# Patient Record
Sex: MALE | Race: WHITE | NOT HISPANIC OR LATINO | Employment: FULL TIME | URBAN - NONMETROPOLITAN AREA
[De-identification: names, ages, dates, MRNs, and addresses within clinical notes are randomized per-mention and may not be internally consistent; named-entity substitution may affect disease eponyms.]

---

## 2018-05-02 ENCOUNTER — OFFICE VISIT CONVERTED (OUTPATIENT)
Dept: FAMILY MEDICINE CLINIC | Age: 53
End: 2018-05-02
Attending: NURSE PRACTITIONER

## 2019-02-12 ENCOUNTER — OFFICE VISIT CONVERTED (OUTPATIENT)
Dept: FAMILY MEDICINE CLINIC | Age: 54
End: 2019-02-12
Attending: NURSE PRACTITIONER

## 2019-02-12 ENCOUNTER — HOSPITAL ENCOUNTER (OUTPATIENT)
Dept: OTHER | Facility: HOSPITAL | Age: 54
Discharge: HOME | End: 2019-02-12
Attending: NURSE PRACTITIONER

## 2019-02-12 LAB
ALBUMIN SERPL-MCNC: 4.5 G/DL (ref 3.5–5)
ALBUMIN/GLOB SERPL: 1.6 {RATIO} (ref 1.4–2.6)
ALP SERPL-CCNC: 73 U/L (ref 56–119)
ALT SERPL-CCNC: 34 U/L (ref 10–40)
ANION GAP SERPL CALC-SCNC: 17 MMOL/L (ref 8–19)
AST SERPL-CCNC: 26 U/L (ref 15–50)
BILIRUB SERPL-MCNC: 0.83 MG/DL (ref 0.2–1.3)
BUN SERPL-MCNC: 14 MG/DL (ref 5–25)
BUN/CREAT SERPL: 13 {RATIO} (ref 6–20)
CALCIUM SERPL-MCNC: 9.6 MG/DL (ref 8.7–10.4)
CHLORIDE SERPL-SCNC: 97 MMOL/L (ref 99–111)
CHOLEST SERPL-MCNC: 141 MG/DL (ref 107–200)
CHOLEST/HDLC SERPL: 3.3 {RATIO} (ref 3–6)
CONV CO2: 26 MMOL/L (ref 22–32)
CONV CREATININE URINE, RANDOM: 299.3 MG/DL (ref 10–300)
CONV MICROALBUM.,U,RANDOM: <12 MG/L (ref 0–20)
CONV TOTAL PROTEIN: 7.3 G/DL (ref 6.3–8.2)
CREAT UR-MCNC: 1.05 MG/DL (ref 0.7–1.2)
EST. AVERAGE GLUCOSE BLD GHB EST-MCNC: 146 MG/DL
GFR SERPLBLD BASED ON 1.73 SQ M-ARVRAT: >60 ML/MIN/{1.73_M2}
GLOBULIN UR ELPH-MCNC: 2.8 G/DL (ref 2–3.5)
GLUCOSE SERPL-MCNC: 111 MG/DL (ref 70–99)
HBA1C MFR BLD: 6.7 % (ref 3.5–5.7)
HDLC SERPL-MCNC: 43 MG/DL (ref 40–60)
LDLC SERPL CALC-MCNC: 77 MG/DL (ref 70–100)
MICROALBUMIN/CREAT UR: 4 MG/G{CRE} (ref 0–25)
OSMOLALITY SERPL CALC.SUM OF ELEC: 283 MOSM/KG (ref 273–304)
POTASSIUM SERPL-SCNC: 3.7 MMOL/L (ref 3.5–5.3)
SODIUM SERPL-SCNC: 136 MMOL/L (ref 135–147)
TRIGL SERPL-MCNC: 103 MG/DL (ref 40–150)
VLDLC SERPL-MCNC: 21 MG/DL (ref 5–37)

## 2019-10-07 ENCOUNTER — HOSPITAL ENCOUNTER (OUTPATIENT)
Dept: OTHER | Facility: HOSPITAL | Age: 54
Discharge: HOME | End: 2019-10-07
Attending: NURSE PRACTITIONER

## 2019-10-07 ENCOUNTER — OFFICE VISIT CONVERTED (OUTPATIENT)
Dept: FAMILY MEDICINE CLINIC | Age: 54
End: 2019-10-07
Attending: NURSE PRACTITIONER

## 2019-10-07 LAB
ALBUMIN SERPL-MCNC: 4.8 G/DL (ref 3.5–5)
ALBUMIN/GLOB SERPL: 1.5 {RATIO} (ref 1.4–2.6)
ALP SERPL-CCNC: 77 U/L (ref 56–119)
ALT SERPL-CCNC: 34 U/L (ref 10–40)
ANION GAP SERPL CALC-SCNC: 22 MMOL/L (ref 8–19)
AST SERPL-CCNC: 25 U/L (ref 15–50)
BILIRUB SERPL-MCNC: 0.76 MG/DL (ref 0.2–1.3)
BUN SERPL-MCNC: 13 MG/DL (ref 5–25)
BUN/CREAT SERPL: 12 {RATIO} (ref 6–20)
CALCIUM SERPL-MCNC: 9.9 MG/DL (ref 8.7–10.4)
CHLORIDE SERPL-SCNC: 97 MMOL/L (ref 99–111)
CHOLEST SERPL-MCNC: 159 MG/DL (ref 107–200)
CHOLEST/HDLC SERPL: 4 {RATIO} (ref 3–6)
CONV CO2: 24 MMOL/L (ref 22–32)
CONV TOTAL PROTEIN: 7.9 G/DL (ref 6.3–8.2)
CREAT UR-MCNC: 1.07 MG/DL (ref 0.7–1.2)
EST. AVERAGE GLUCOSE BLD GHB EST-MCNC: 186 MG/DL
GFR SERPLBLD BASED ON 1.73 SQ M-ARVRAT: >60 ML/MIN/{1.73_M2}
GLOBULIN UR ELPH-MCNC: 3.1 G/DL (ref 2–3.5)
GLUCOSE SERPL-MCNC: 152 MG/DL (ref 70–99)
HBA1C MFR BLD: 8.1 % (ref 3.5–5.7)
HDLC SERPL-MCNC: 40 MG/DL (ref 40–60)
LDLC SERPL CALC-MCNC: 72 MG/DL (ref 70–100)
OSMOLALITY SERPL CALC.SUM OF ELEC: 291 MOSM/KG (ref 273–304)
POTASSIUM SERPL-SCNC: 3.8 MMOL/L (ref 3.5–5.3)
SODIUM SERPL-SCNC: 139 MMOL/L (ref 135–147)
TRIGL SERPL-MCNC: 234 MG/DL (ref 40–150)
VLDLC SERPL-MCNC: 47 MG/DL (ref 5–37)

## 2020-01-27 ENCOUNTER — HOSPITAL ENCOUNTER (OUTPATIENT)
Dept: OTHER | Facility: HOSPITAL | Age: 55
Discharge: HOME | End: 2020-01-27
Attending: NURSE PRACTITIONER

## 2020-01-27 LAB
ALBUMIN SERPL-MCNC: 4.7 G/DL (ref 3.5–5)
ALBUMIN/GLOB SERPL: 1.7 {RATIO} (ref 1.4–2.6)
ALP SERPL-CCNC: 80 U/L (ref 56–119)
ALT SERPL-CCNC: 29 U/L (ref 10–40)
ANION GAP SERPL CALC-SCNC: 20 MMOL/L (ref 8–19)
AST SERPL-CCNC: 23 U/L (ref 15–50)
BILIRUB SERPL-MCNC: 0.56 MG/DL (ref 0.2–1.3)
BUN SERPL-MCNC: 13 MG/DL (ref 5–25)
BUN/CREAT SERPL: 13 {RATIO} (ref 6–20)
CALCIUM SERPL-MCNC: 9.8 MG/DL (ref 8.7–10.4)
CHLORIDE SERPL-SCNC: 97 MMOL/L (ref 99–111)
CHOLEST SERPL-MCNC: 142 MG/DL (ref 107–200)
CHOLEST/HDLC SERPL: 4.3 {RATIO} (ref 3–6)
CONV CO2: 26 MMOL/L (ref 22–32)
CONV TOTAL PROTEIN: 7.5 G/DL (ref 6.3–8.2)
CREAT UR-MCNC: 1.03 MG/DL (ref 0.7–1.2)
EST. AVERAGE GLUCOSE BLD GHB EST-MCNC: 163 MG/DL
GFR SERPLBLD BASED ON 1.73 SQ M-ARVRAT: >60 ML/MIN/{1.73_M2}
GLOBULIN UR ELPH-MCNC: 2.8 G/DL (ref 2–3.5)
GLUCOSE SERPL-MCNC: 106 MG/DL (ref 70–99)
HBA1C MFR BLD: 7.3 % (ref 3.5–5.7)
HDLC SERPL-MCNC: 33 MG/DL (ref 40–60)
LDLC SERPL CALC-MCNC: 50 MG/DL (ref 70–100)
OSMOLALITY SERPL CALC.SUM OF ELEC: 291 MOSM/KG (ref 273–304)
POTASSIUM SERPL-SCNC: 3.4 MMOL/L (ref 3.5–5.3)
SODIUM SERPL-SCNC: 140 MMOL/L (ref 135–147)
TRIGL SERPL-MCNC: 295 MG/DL (ref 40–150)
VLDLC SERPL-MCNC: 59 MG/DL (ref 5–37)

## 2020-04-13 ENCOUNTER — OFFICE VISIT CONVERTED (OUTPATIENT)
Dept: FAMILY MEDICINE CLINIC | Age: 55
End: 2020-04-13
Attending: NURSE PRACTITIONER

## 2020-08-10 ENCOUNTER — HOSPITAL ENCOUNTER (OUTPATIENT)
Dept: OTHER | Facility: HOSPITAL | Age: 55
Discharge: HOME | End: 2020-08-10
Attending: NURSE PRACTITIONER

## 2020-08-13 LAB
ALBUMIN SERPL-MCNC: 4.8 G/DL (ref 3.5–5)
ALBUMIN/GLOB SERPL: 1.7 {RATIO} (ref 1.4–2.6)
ALP SERPL-CCNC: 82 U/L (ref 56–119)
ALT SERPL-CCNC: 24 U/L (ref 10–40)
ANION GAP SERPL CALC-SCNC: 18 MMOL/L (ref 8–19)
AST SERPL-CCNC: 24 U/L (ref 15–50)
BILIRUB SERPL-MCNC: 1.26 MG/DL (ref 0.2–1.3)
BUN SERPL-MCNC: 29 MG/DL (ref 5–25)
BUN/CREAT SERPL: 22 {RATIO} (ref 6–20)
CALCIUM SERPL-MCNC: 10.7 MG/DL (ref 8.7–10.4)
CHLORIDE SERPL-SCNC: 96 MMOL/L (ref 99–111)
CHOLEST SERPL-MCNC: 134 MG/DL (ref 107–200)
CHOLEST/HDLC SERPL: 3 {RATIO} (ref 3–6)
CONV CO2: 25 MMOL/L (ref 22–32)
CONV CREATININE URINE, RANDOM: 247 MG/DL (ref 10–300)
CONV MICROALBUM.,U,RANDOM: <12 MG/L (ref 0–20)
CONV TOTAL PROTEIN: 7.7 G/DL (ref 6.3–8.2)
CREAT UR-MCNC: 1.33 MG/DL (ref 0.7–1.2)
EST. AVERAGE GLUCOSE BLD GHB EST-MCNC: 174 MG/DL
GFR SERPLBLD BASED ON 1.73 SQ M-ARVRAT: >60 ML/MIN/{1.73_M2}
GLOBULIN UR ELPH-MCNC: 2.9 G/DL (ref 2–3.5)
GLUCOSE SERPL-MCNC: 98 MG/DL (ref 70–99)
HBA1C MFR BLD: 7.7 % (ref 3.5–5.7)
HDLC SERPL-MCNC: 44 MG/DL (ref 40–60)
LDLC SERPL CALC-MCNC: 71 MG/DL (ref 70–100)
MICROALBUMIN/CREAT UR: 4.9 MG/G{CRE} (ref 0–25)
OSMOLALITY SERPL CALC.SUM OF ELEC: 288 MOSM/KG (ref 273–304)
POTASSIUM SERPL-SCNC: 3.4 MMOL/L (ref 3.5–5.3)
SODIUM SERPL-SCNC: 136 MMOL/L (ref 135–147)
TRIGL SERPL-MCNC: 97 MG/DL (ref 40–150)
VLDLC SERPL-MCNC: 19 MG/DL (ref 5–37)

## 2020-08-31 ENCOUNTER — HOSPITAL ENCOUNTER (OUTPATIENT)
Dept: OTHER | Facility: HOSPITAL | Age: 55
Discharge: HOME | End: 2020-08-31
Attending: NURSE PRACTITIONER

## 2020-08-31 LAB
ANION GAP SERPL CALC-SCNC: 18 MMOL/L (ref 8–19)
BUN SERPL-MCNC: 19 MG/DL (ref 5–25)
BUN/CREAT SERPL: 17 {RATIO} (ref 6–20)
CALCIUM SERPL-MCNC: 10.1 MG/DL (ref 8.7–10.4)
CHLORIDE SERPL-SCNC: 100 MMOL/L (ref 99–111)
CONV CO2: 25 MMOL/L (ref 22–32)
CREAT UR-MCNC: 1.1 MG/DL (ref 0.7–1.2)
GFR SERPLBLD BASED ON 1.73 SQ M-ARVRAT: >60 ML/MIN/{1.73_M2}
GLUCOSE SERPL-MCNC: 131 MG/DL (ref 70–99)
OSMOLALITY SERPL CALC.SUM OF ELEC: 292 MOSM/KG (ref 273–304)
POTASSIUM SERPL-SCNC: 3.5 MMOL/L (ref 3.5–5.3)
SODIUM SERPL-SCNC: 139 MMOL/L (ref 135–147)

## 2021-01-07 ENCOUNTER — HOSPITAL ENCOUNTER (OUTPATIENT)
Dept: OTHER | Facility: HOSPITAL | Age: 56
Discharge: HOME | End: 2021-01-07
Attending: NURSE PRACTITIONER

## 2021-01-07 LAB
ALBUMIN SERPL-MCNC: 4.6 G/DL (ref 3.5–5)
ALBUMIN/GLOB SERPL: 1.5 {RATIO} (ref 1.4–2.6)
ALP SERPL-CCNC: 90 U/L (ref 56–119)
ALT SERPL-CCNC: 33 U/L (ref 10–40)
ANION GAP SERPL CALC-SCNC: 18 MMOL/L (ref 8–19)
AST SERPL-CCNC: 22 U/L (ref 15–50)
BILIRUB SERPL-MCNC: 1.25 MG/DL (ref 0.2–1.3)
BUN SERPL-MCNC: 16 MG/DL (ref 5–25)
BUN/CREAT SERPL: 16 {RATIO} (ref 6–20)
CALCIUM SERPL-MCNC: 9.6 MG/DL (ref 8.7–10.4)
CHLORIDE SERPL-SCNC: 97 MMOL/L (ref 99–111)
CHOLEST SERPL-MCNC: 164 MG/DL (ref 107–200)
CHOLEST/HDLC SERPL: 3.7 {RATIO} (ref 3–6)
CONV CO2: 25 MMOL/L (ref 22–32)
CONV TOTAL PROTEIN: 7.7 G/DL (ref 6.3–8.2)
CREAT UR-MCNC: 1.02 MG/DL (ref 0.7–1.2)
EST. AVERAGE GLUCOSE BLD GHB EST-MCNC: 174 MG/DL
GFR SERPLBLD BASED ON 1.73 SQ M-ARVRAT: >60 ML/MIN/{1.73_M2}
GLOBULIN UR ELPH-MCNC: 3.1 G/DL (ref 2–3.5)
GLUCOSE SERPL-MCNC: 180 MG/DL (ref 70–99)
HBA1C MFR BLD: 7.7 % (ref 3.5–5.7)
HDLC SERPL-MCNC: 44 MG/DL (ref 40–60)
LDLC SERPL CALC-MCNC: 99 MG/DL (ref 70–100)
OSMOLALITY SERPL CALC.SUM OF ELEC: 288 MOSM/KG (ref 273–304)
POTASSIUM SERPL-SCNC: 3.8 MMOL/L (ref 3.5–5.3)
SODIUM SERPL-SCNC: 136 MMOL/L (ref 135–147)
TRIGL SERPL-MCNC: 106 MG/DL (ref 40–150)
VLDLC SERPL-MCNC: 21 MG/DL (ref 5–37)

## 2021-02-01 ENCOUNTER — OFFICE VISIT CONVERTED (OUTPATIENT)
Dept: FAMILY MEDICINE CLINIC | Age: 56
End: 2021-02-01
Attending: NURSE PRACTITIONER

## 2021-05-18 NOTE — PROGRESS NOTES
Jared Restrepo 1965     Office/Outpatient Visit    Visit Date: Wed, May 2, 2018 09:25 am    Provider: Anamaria Edwards N.P. (Assistant: Janee Edwards MA)    Location: [facility]        Electronically signed by Anamaria Edwards N.P. on  05/02/2018 12:45:35 PM                             SUBJECTIVE:        CC:     Mr. Restrepo is a 53 year old Black or  male.  Patient is here for routine check up.          HPI:         Patient to be evaluated for hypercholesterolemia.  Current treatment includes Lipitor.  Compliance with treatment has been good; he takes his medication as directed.  He denies experiencing any hypercholesterolemia related symptoms.          In regard to the hypertension, his current cardiac medication regimen includes an ACE inhibitor ( Zestril ) and a combination medication ( Tenoretic 50 ).  He is tolerating the medication well without side effects.  Compliance with treatment has been good; he takes his medication as directed.          Dx with type 2 diabetes; current meds include an oral hypoglycemic ( Glucophage XR ( 500mg qd ) ).  Most recent lab results include Hemoglobin A1c:  6.8 (03/23/2018), LDL:  97 (mg/dL) (09/12/2017), HDL:  44 (mg/dL) (09/12/2017), Triglycerides:  94 (mg/dL) (09/12/2017), Microalbuminuria:  8.4 (mg/g creat) (09/12/2017), Dilated Eye Exam by date:  03/14/2016 (01/10/2017), Foot Exam (Annual):  09/12/2017 (09/12/2017).      ROS:     CONSTITUTIONAL:  Negative for chills, fatigue, fever, and weight change.      CARDIOVASCULAR:  Negative for chest pain, palpitations, tachycardia, orthopnea, and edema.      RESPIRATORY:  Negative for cough, dyspnea, and hemoptysis.      NEUROLOGICAL:  Negative for dizziness, headaches, paresthesias, and weakness.          PMH/FMH/SH:     Last Reviewed on 5/02/2018 10:08 AM by Anamaria Edwards    Past Medical History:         Hypertension         Surgical History:         lipoma;     Procedures:     Colonoscopy ( 3-17-16 normal [facility] )         Family History:     Father:  at age 40's; Cause of death was pancreatitis    ; Positive for Type 2 Diabetes;     Mother:    Positive for Arrhythmia;     ; Positive for Lung Cancer;     Brother(s): 2 brother(s) total    ; Positive for Type 2 Diabetes;     Sister(s): 1 sister(s) total    ; Positive for thyroid disorder;     Daughter(s): Healthy; 1 daughter(s) total     Paternal Grandfather: Cause of death was cancer     Paternal Grandmother: Cause of death was cancer     Maternal Grandfather:      Maternal Grandmother:  Father:  at age 40's; Cause of death was pancreatitis     Mother: Lung Cancer     Son(s): 3 son(s) total; 1 ;  MVA     Daughter(s): Healthy; 1 daughter(s) total         Social History:     Occupation: [employer]      Marital Status:      Children: 4 children         Tobacco/Alcohol/Supplements:     Last Reviewed on 2018 09:26 AM by Janee Edwards    Tobacco: He has never smoked.              Immunizations:     None        Allergies:     Last Reviewed on 2018 09:28 AM by Janee Edwards    Penicillins:        Current Medications:     Last Reviewed on 2018 09:28 AM by Janee Edwards    Atenolol/Chlorthalidone 50mg/25mg Tablet Take 1/2 tablet daily     Lipitor 10mg Tablet 1 tab daily     Lisinopril 10mg Tablet Take 1 tablet(s) by mouth daily     Metformin HCl 500mg Tablets, Extended Release 1 tab daily         OBJECTIVE:        Vitals:         Current: 2018 9:28:12 AM    Ht:  6 ft, 0 in;  Wt: 220.6 lbs;  BMI: 29.9    T: 98.5 F (oral);  BP: 119/64 mm Hg (left arm, sitting);  P: 61 bpm (left arm (BP Cuff), sitting);  sCr: 1 mg/dL;  GFR: 91.92        Exams:     PHYSICAL EXAM:     GENERAL: Vitals recorded well developed, well nourished;  well groomed;  no apparent distress;     NECK: carotid exam reveals no bruits;     RESPIRATORY: normal respiratory rate and pattern with no distress; normal  breath sounds with no rales, rhonchi, wheezes or rubs;     CARDIOVASCULAR: normal rate; rhythm is regular;  no systolic murmur; no edema;     PSYCHIATRIC:  appropriate affect and demeanor; normal speech pattern; grossly normal memory;         Procedures:     Vaccination against hepatitis A     1. Hepatitis A (adult): 1.0 ml given IM in the right upper arm; administered by mlb;  lot number ; expires 10-; Information sheet given             ASSESSMENT           272.0   E78.5  Hypercholesterolemia              DDx:     401.1   I10  Hypertension              DDx:     250.00   E11.9  Type 2 diabetes              DDx:     V69.8   Z72.89  Other problems related to lifestyle              DDx:     V05.3   Z23  Vaccination against hepatitis A              DDx:         ORDERS:         Meds Prescribed:       Refill of: Atenolol/Chlorthalidone 50mg/25mg Tablet Take 1/2 tablet daily  #45 (Forty Five) tablet(s) Refills: 1       Refill of: Lipitor (Atorvastatin Calcium) 10mg Tablet 1 tab daily  #90 (Ninety) tablet(s) Refills: 1       Refill of: Lisinopril 10mg Tablet Take 1 tablet(s) by mouth daily  #90 (Ninety) tablet(s) Refills: 1       Refill of: Metformin HCl 500mg Tablets, Extended Release 1 tab daily  #90 (Ninety) tablet(s) Refills: 1         Lab Orders:       [id number]  [id number] [facility] ([facility] Antibody Medicare screen  (Send-Out)         52142  [id number] [facility] Comp Metabolic Panel (14)  (Send-Out)         22420  [id number] [facility] Lipid Panel (Excludes LDL/HDL ratio)  (Send-Out)           Procedures Ordered:       [id number]  Immunization administration; one vaccine  (In-House)         65036  HepA vaccine adult dose for intramuscular use  (In-House)                   PLAN:          Hypercholesterolemia He is going to [place] next week.     LABORATORY:  [facility].  CMP Lipid panel           Prescriptions:       Refill of: Lipitor (Atorvastatin Calcium) 10mg Tablet 1 tab daily   #90 (Ninety) tablet(s) Refills: 1           Orders:       [id number]  [id number] [facility] Comp Metabolic Panel (14)  (Send-Out)         18863  [id number] [facility] Lipid Panel (Excludes LDL/HDL ratio)  (Send-Out)            Hypertension           Prescriptions:       Refill of: Atenolol/Chlorthalidone 50mg/25mg Tablet Take 1/2 tablet daily  #45 (Forty Five) tablet(s) Refills: 1       Refill of: Lisinopril 10mg Tablet Take 1 tablet(s) by mouth daily  #90 (Ninety) tablet(s) Refills: 1          Type 2 diabetes he had not been exercising or eating as healthy over the winter, he is back on track now; had a recent A1C, will recheck that in a few months /reviewed diabetes flow sheet         RECOMMENDATIONS: eat heatlhy, regular exercise, monitor sugars.      FOLLOW-UP: repeat A1C in 2-3 months           Prescriptions:       Refill of: Metformin HCl 500mg Tablets, Extended Release 1 tab daily  #90 (Ninety) tablet(s) Refills: 1          Other problems related to lifestyle     LABORATORY:  Labs ordered to be performed today at [facility] include Hepatitis C Screening.            Orders:       [id number]  [id number] [facility] Hepatitic C (HCV) Antibody Medicare screen  (Send-Out)             Patient Education Handouts:       Hepatitis C           Vaccination against hepatitis A           Orders:       [id number]  Immunization administration; one vaccine  (In-House)         83115  HepA vaccine adult dose for intramuscular use  (In-House)               CHARGE CAPTURE           **Please note: ICD descriptions below are intended for billing purposes only and may not represent clinical diagnoses**        Primary Diagnosis:         272.0 Hypercholesterolemia            E78.5    Hyperlipidemia, unspecified              Orders:          62147   Office/outpatient visit; established patient, level 4  (In-House)           401.1 Hypertension            I10    Essential (primary) hypertension    250.00 Type 2 diabetes            E11.9    Type 2 diabetes mellitus without  complications    V69.8 Other problems related to lifestyle            Z72.89    Other problems related to lifestyle    V05.3 Vaccination against hepatitis A            Z23    Encounter for immunization              Orders:          03220   Immunization administration; one vaccine  (In-House)             22147   HepA vaccine adult dose for intramuscular use  (In-House)

## 2021-05-18 NOTE — PROGRESS NOTES
Jared Restrepo  1965     Office/Outpatient Visit    Visit Date: Mon, Feb 1, 2021 09:14 am    Provider: Anamaria Edwards N.P. (Assistant: Sidra Chamberlain MA)    Location: [facility]        Electronically signed by Anamaria Edwards N.P. on  02/01/2021 09:49:01 AM                             Subjective:        CC: Mr. Restrepo is a 55 year old Black or  male.  This is a follow-up visit.          HPI:           Patient presents with essential (primary) hypertension.  His current cardiac medication regimen includes an ACE inhibitor ( Zestril ) and a combination medication ( Tenoretic 50 ).  He did not bring his blood pressure diary, but says that pressures have been well controlled.  He is tolerating the medication well without side effects.  Compliance with treatment has been good; he takes his medication as directed.            Dx with type 2 diabetes mellitus without complications; current meds include an oral hypoglycemic ( Glucophage ( Other (enter) ) and metformin 750 ).  He reports home blood glucose readings have been excellent, with average fasting glucoses running <120 mg/dL.  Most recent lab results include Weight (lb):  227.6 (10/07/2019), Hemoglobin A1c:  7.7 (%) (01/07/2021), LDL:  99 (mg/dL) (01/07/2021), HDL:  44 (mg/dL) (01/07/2021), Triglycerides:  106 (mg/dL) (01/07/2021), Microalbuminuria:  4.9 (mg/g creat) (08/10/2020), Dilated Eye Exam by date:  10/01/2019 (10/07/2019), Foot Exam (Annual):  02/12/2019 (02/12/2019).            Mixed hyperlipidemia details; current treatment includes Lipitor.  Compliance with treatment has been good.  He denies experiencing any hypercholesterolemia related symptoms.      ROS:     CONSTITUTIONAL:  Negative for fever.  increased sweets during holiday's and does not exercise as much in the winter     EYES:  Negative for blurred vision.      CARDIOVASCULAR:  Negative for chest pain, palpitations, tachycardia, orthopnea, and  edema.      RESPIRATORY:  Negative for recent cough and dyspnea.      GASTROINTESTINAL:  Negative for abdominal pain, heartburn, constipation, diarrhea, and stool changes.      MUSCULOSKELETAL:  Negative for feet pain.      NEUROLOGICAL:  Negative for dizziness, headaches, paresthesias, and weakness.          Past Medical History / Family History / Social History:         Last Reviewed on 2021 09:30 AM by Anamaria Edwards    Past Medical History:         Hypertension         Surgical History:         lipoma;     Procedures:    Colonoscopy ( 3-17-16 normal [facility] )         Family History:     Father:  at age 40's; Cause of death was pancreatitis    ; Positive for Type 2 Diabetes;     Mother:    Positive for Arrhythmia;     ; Positive for Lung Cancer;     Brother(s): 2 brother(s) total    ; Positive for Type 2 Diabetes;     Sister(s): 1 sister(s) total    ; Positive for thyroid disorder;     Daughter(s): Healthy; 1 daughter(s) total     Paternal Grandfather: Cause of death was cancer     Paternal Grandmother: Cause of death was cancer     Maternal Grandfather:      Maternal Grandmother:  Father:  at age 40's; Cause of death was pancreatitis     Mother: Lung Cancer     Son(s): 3 son(s) total; 1 ;  MVA     Daughter(s): Healthy; 1 daughter(s) total         Social History:     Occupation: [employer]  /local goes in a 2 am     Marital Status:      Children: 4 children         Tobacco/Alcohol/Supplements:     Last Reviewed on 2021 09:18 AM by Sidra Chamberlain    Tobacco: He has never smoked.          Immunizations:     Hep A, adult dose 2018    Hep A, adult dose 2019        Allergies:     Last Reviewed on 2021 09:18 AM by Sidra Chamberlain    Penicillins:          Current Medications:     Last Reviewed on 2021 09:18 AM by Sidra Chamberlain    atenoloL-chlorthalidone 50-25 mg oral tablet [TAKE 1/2 TABLET DAILY]    metFORMIN 750 mg oral Tablet, Extended  Release 24 hr [TAKE 1 TABLET ONCE DAILY   WITH THE EVENING MEAL]    lisinopriL 10 mg oral tablet [TAKE 1 TABLET DAILY]    atorvastatin 10 mg oral tablet [TAKE 1 TABLET DAILY]        Objective:        Vitals:         Current: 2/1/2021 9:20:05 AM    Ht:  6 ft, 0 in;  Wt: 223.8 lbs;  BMI: 30.4T: 97.3 F (temporal);  BP: 124/83 mm Hg (left arm, sitting);  P: 68 bpm (left arm (BP Cuff), sitting);  sCr: 1.02 mg/dL;  GFR: 88.65        Exams:     PHYSICAL EXAM:     GENERAL: Vitals recorded well developed, well nourished;  well groomed;  no apparent distress;     NECK: carotid exam reveals no bruits;     RESPIRATORY: normal respiratory rate and pattern with no distress; normal breath sounds with no rales, rhonchi, wheezes or rubs;     CARDIOVASCULAR: normal rate; rhythm is regular;  no systolic murmur;    Peripheral Pulses: posterior tibial: equal bilaterally;  no edema;     SKIN: skin of feet and toenails intact bilaterally;     NEUROLOGIC: sensation intact to monofilament bilaterally;     PSYCHIATRIC:  appropriate affect and demeanor; normal speech pattern; grossly normal memory;         Assessment:         I10   Essential (primary) hypertension       E11.9   Type 2 diabetes mellitus without complications       E78.2   Mixed hyperlipidemia           ORDERS:         Meds Prescribed:       [Refilled] atenoloL-chlorthalidone 50-25 mg oral tablet [TAKE 1/ 2  TABLET DAILY], #45 (forty five) tablets, Refills: 1 (one)       [New Rx] metFORMIN 750 mg oral Tablet, Extended Release 24 hr [take 1 tablet (750 mg) by oral route once daily with the evening meal], #90 (ninety) tablets, Refills: 0 (zero)       [Refilled] atorvastatin 10 mg oral tablet [TAKE 1 TABLET DAILY], #90 (ninety) tablets, Refills: 0 (zero)       [Refilled] lisinopriL 10 mg oral tablet [TAKE 1 TABLET DAILY], #90 (ninety) tablets, Refills: 0 (zero)                 Plan:         Essential (primary) hypertensionreviewed labs, copy to pt         RECOMMENDATIONS given  include: perform routine monitoring of blood pressure with home blood pressure cuff.            Prescriptions:       [Refilled] atenoloL-chlorthalidone 50-25 mg oral tablet [TAKE 1/ 2  TABLET DAILY], #45 (forty five) tablets, Refills: 1 (one)       [Refilled] lisinopriL 10 mg oral tablet [TAKE 1 TABLET DAILY], #90 (ninety) tablets, Refills: 0 (zero)         Type 2 diabetes mellitus without complicationsadvised to make follow up appt with Dr Hurtado /updated diabetes flow sheet        RECOMMENDATIONS: work on diet, weight loss and regualr exercise, he does not want to increase his dose of Metformin at this time.      FOLLOW-UP: Schedule a follow-up visit in 3 months. A1C           Prescriptions:       [New Rx] metFORMIN 750 mg oral Tablet, Extended Release 24 hr [take 1 tablet (750 mg) by oral route once daily with the evening meal], #90 (ninety) tablets, Refills: 0 (zero)         Mixed hyperlipidemia        RECOMMENDATIONS given include: exercise and low cholesterol/low fat diet.            Prescriptions:       [Refilled] atorvastatin 10 mg oral tablet [TAKE 1 TABLET DAILY], #90 (ninety) tablets, Refills: 0 (zero)             Patient Recommendations:        For  Essential (primary) hypertension:    Begin monitoring your blood pressure by brief nurse visits at our office, a home blood pressure monitor, or by checking on the machines in pharmacies or stores.  Keep a log of the readings.          For  Type 2 diabetes mellitus without complications:    Schedule a follow-up visit in 3 months.          For  Mixed hyperlipidemia:    Maintain a regular exercise program. Reduce the amount of cholesterol and saturated fat in your diet.              Charge Capture:         Primary Diagnosis:     I10  Essential (primary) hypertension           Orders:      [id number]  Office/outpatient visit; established patient, level 4  (In-House)              E11.9  Type 2 diabetes mellitus without complications     E78.2  Mixed hyperlipidemia

## 2021-05-18 NOTE — PROGRESS NOTES
Jared Restrepo  1965     Office/Outpatient Visit    Visit Date: Mon, Apr 13, 2020 08:43 am    Provider: Anamaria Edwards N.P. (Assistant: Holly Desouza MA)    Location: [facility]        Electronically signed by Anamaria Edwards N.P. on  04/14/2020 09:10:42 AM                             Subjective:        CC: Mr. Restrepo is a 55 year old Black or  male.  Medication refill         HPI: TELEMEDICINE VISIT:    -Jared consented to this telemedicine visit.    - Persons present during the telemedicine consultation include: Jared - patient, CHRISTELLE Doan          Patient to be evaluated for essential (primary) hypertension.  His current cardiac medication regimen includes an ACE inhibitor ( Prinivil ) and a combination medication ( Maxzide ).  He has not kept a blood pressure diary, but states that typical readings show systolics in the 120s and diastolics in the 80s.  He is tolerating the medication well without side effects.  Compliance with treatment has been good; he takes his medication as directed, maintains his diet and exercise regimen, and follows up as directed.            Concerning type 2 diabetes mellitus without complications, current meds include an oral hypoglycemic ( Glucophage XR ( 500mg qd ) ).  He reports home blood glucose readings have averaged fasting readings in the 105-130's mg/dL range.  Most recent lab results include Hemoglobin A1c:  7.3 (%) (01/27/2020), LDL:  50 (mg/dL) (01/27/2020), HDL:  33 (mg/dL) (01/27/2020), Triglycerides:  295 (mg/dL) (01/27/2020), Microalbuminuria:  4.0 (mg/g creat) (02/12/2019), Dilated Eye Exam by date:  10/01/2019 (10/07/2019).  He tried taking 1000 mg a day and did not care for that dose.  Wants to know if there is something between the 500 and 1000 mg dose.           Concerning mixed hyperlipidemia, current treatment includes Lipitor.  Compliance with treatment has been good; he takes his medication as directed.   He denies experiencing any hypercholesterolemia related symptoms.      ROS:     CONSTITUTIONAL:  Negative for chills, fatigue, fever, and weight change.      CARDIOVASCULAR:  Negative for chest pain, palpitations, tachycardia, orthopnea, and edema.      RESPIRATORY:  Negative for cough, dyspnea, and hemoptysis.      NEUROLOGICAL:  Negative for dizziness, headaches, paresthesias, and weakness.          Past Medical History / Family History / Social History:         Last Reviewed on 2020 08:56 AM by Anamaria Edwards    Past Medical History:         Hypertension         Surgical History:         lipoma;     Procedures:    Colonoscopy ( 3-17-16 normal [facility] )         Family History:     Father:  at age 40's; Cause of death was pancreatitis    ; Positive for Type 2 Diabetes;     Mother:    Positive for Arrhythmia;     ; Positive for Lung Cancer;     Brother(s): 2 brother(s) total    ; Positive for Type 2 Diabetes;     Sister(s): 1 sister(s) total    ; Positive for thyroid disorder;     Daughter(s): Healthy; 1 daughter(s) total     Paternal Grandfather: Cause of death was cancer     Paternal Grandmother: Cause of death was cancer     Maternal Grandfather:      Maternal Grandmother:  Father:  at age 40's; Cause of death was pancreatitis     Mother: Lung Cancer     Son(s): 3 son(s) total; 1 ;  MVA     Daughter(s): Healthy; 1 daughter(s) total         Social History:     Occupation: [employer]  /local goes in a 2 am     Marital Status:      Children: 4 children         Tobacco/Alcohol/Supplements:     Last Reviewed on 2020 08:44 AM by Holly Desouza    Tobacco: He has never smoked.          Immunizations:     Hep A, adult dose 2018    Hep A, adult dose 2019        Allergies:     Last Reviewed on 2020 08:45 AM by Holly Desouza    Penicillins:          Current Medications:     Last Reviewed on 2020 08:45 AM by Holly Desouza     atenoloL-chlorthalidone 50-25 mg oral tablet [Take 1/2 tablet daily]    metFORMIN 500 mg oral Tablet, Extended Release 24 hr [TAKE 1 TABLET BY MOUTH EVERY DAY]    lisinopriL 10 mg oral tablet [Take 1 tablet(s) by mouth daily]    Lipitor 10 mg oral tablet [1 tab daily]        Assessment:         I10   Essential (primary) hypertension       E11.9   Type 2 diabetes mellitus without complications       E78.2   Mixed hyperlipidemia           ORDERS:         Meds Prescribed:       [Refilled] atenoloL-chlorthalidone 50-25 mg oral tablet [Take 1/2 tablet daily], #45 (forty five) tablets, Refills: 0 (zero)       [Refilled] lisinopriL 10 mg oral tablet [Take 1 tablet(s) by mouth daily], #90 (ninety) tablets, Refills: 0 (zero)       [Refilled] metFORMIN 750 mg oral Tablet, Extended Release 24 hr [take 1 tablet (750 mg) by oral route once daily with the evening meal], #90 (ninety) tablets, Refills: 0 (zero)       [Refilled] Lipitor 10 mg oral tablet [1 tab daily], #90 (ninety) tablets, Refills: 0 (zero)         Lab Orders:       FUTURE  Future order to be done at patients convenience  (Send-Out)            [id number]  [address] CMP A1C LIPID AND MICRO ALBUM CR RATIO: [id number]  (Send-Out)                      Plan:         Essential (primary) hypertension        RECOMMENDATIONS given include: perform routine monitoring of blood pressure with home blood pressure cuff.  Telehealth: Verbal consent obtained for visit to occur via phone call; Total time spent was 12 minutes; [id number]--Rgfcsdawf E/M 11-20 minutes           Prescriptions:       [Refilled] atenoloL-chlorthalidone 50-25 mg oral tablet [Take 1/2 tablet daily], #45 (forty five) tablets, Refills: 0 (zero)       [Refilled] lisinopriL 10 mg oral tablet [Take 1 tablet(s) by mouth daily], #90 (ninety) tablets, Refills: 0 (zero)         Type 2 diabetes mellitus without complicationsreviewed diabetes flow sheet, will try the 750 mg dose of metfromin          FOLLOW-UP TESTING #1: FOLLOW-UP LABORATORY:  Labs to be scheduled in the future include Diabetes Panel 2;CMP, A1C, Lipid, Microalbumin:Creatinine Ratio.      RECOMMENDATIONS: work on healthy eating and regular exercise as well as testing his gluose.      FOLLOW-UP: Schedule a follow-up visit in 3 months. fastig for labs           Prescriptions:       [Refilled] metFORMIN 750 mg oral Tablet, Extended Release 24 hr [take 1 tablet (750 mg) by oral route once daily with the evening meal], #90 (ninety) tablets, Refills: 0 (zero)           Orders:       FUTURE  Future order to be done at patients convenience  (Send-Out)            [id number]  [address] CMP A1C LIPID AND MICRO ALBUM CR RATIO: [id number]  (Send-Out)              Mixed hyperlipidemia          Prescriptions:       [Refilled] Lipitor 10 mg oral tablet [1 tab daily], #90 (ninety) tablets, Refills: 0 (zero)             Patient Recommendations:        For  Essential (primary) hypertension:    Begin monitoring your blood pressure by brief nurse visits at our office, a home blood pressure monitor, or by checking on the machines in pharmacies or stores.  Keep a log of the readings.          For  Type 2 diabetes mellitus without complications:            The following laboratory testing has been ordered: Schedule a follow-up visit in 3 months.              Charge Capture:         Primary Diagnosis:     I10  Essential (primary) hypertension           Orders:      [id number]  Phys/QHP telephone evaluation 11-20 minutes  (In-House)              E11.9  Type 2 diabetes mellitus without complications     E78.2  Mixed hyperlipidemia

## 2021-05-18 NOTE — PROGRESS NOTES
Jared Restrepo 1965     Office/Outpatient Visit    Visit Date: Tue, Feb 12, 2019 01:45 pm    Provider: Anamaria Edwards N.P. (Assistant: Chyna Tapia LPN)    Location: [facility]        Electronically signed by Anamaria Edwards N.P. on  02/12/2019 03:45:17 PM                             SUBJECTIVE:        CC:     Mr. Restrepo is a 53 year old Black or  male.  med refills, A1C, hep a vaccine         HPI:         Patient to be evaluated for type 2 diabetes.  Current meds include an oral hypoglycemic ( Glucophage XR ( 500mg qd ) ).  He reports home blood glucose readings have been excellent, with average fasting glucoses running <120 mg/dL.  Most recent lab results include HDL:  41 (mg/dL) (05/02/2018), Hemoglobin A1c:  5.9 (07/13/2018), LDL:  65 (mg/dL) (05/02/2018), Triglycerides:  62 (mg/dL) (05/02/2018), Microalbuminuria:  8.4 (mg/g creat) (09/12/2017), Dilated Eye Exam by date:  03/21/2017 (05/02/2018), Foot Exam (Annual):  09/12/2017 (09/12/2017).          Dx with hypertension; his current cardiac medication regimen includes an ACE inhibitor ( Zestril ) and a combination medication ( Tenoretic 50 ).  He is tolerating the medication well without side effects.  Compliance with treatment has been good; he takes his medication as directed.          Hypercholesterolemia details; current treatment includes Lipitor.  Compliance with treatment has been good; he takes his medication as directed.  He denies experiencing any hypercholesterolemia related symptoms.      ROS:     CONSTITUTIONAL:  Negative for chills, fatigue, fever, and weight change.      CARDIOVASCULAR:  Negative for chest pain, palpitations, tachycardia, orthopnea, and edema.      RESPIRATORY:  Negative for cough, dyspnea, and hemoptysis.      NEUROLOGICAL:  Negative for dizziness, headaches, paresthesias, and weakness.          PMH/FMH/SH:     Last Reviewed on 2/12/2019 02:13 PM by Anamaria Edwards    Past Medical  Security called for safety check   History:         Hypertension         Surgical History:         lipoma;     Procedures:    Colonoscopy ( 3-17-16 normal [facility] )         Family History:     Father:  at age 40's; Cause of death was pancreatitis    ; Positive for Type 2 Diabetes;     Mother:    Positive for Arrhythmia;     ; Positive for Lung Cancer;     Brother(s): 2 brother(s) total    ; Positive for Type 2 Diabetes;     Sister(s): 1 sister(s) total    ; Positive for thyroid disorder;     Daughter(s): Healthy; 1 daughter(s) total     Paternal Grandfather: Cause of death was cancer     Paternal Grandmother: Cause of death was cancer     Maternal Grandfather:      Maternal Grandmother:  Father:  at age 40's; Cause of death was pancreatitis     Mother: Lung Cancer     Son(s): 3 son(s) total; 1 ;  MVA     Daughter(s): Healthy; 1 daughter(s) total         Social History:     Occupation: [employer]  /local goes in a 2 am     Marital Status:      Children: 4 children         Tobacco/Alcohol/Supplements:     Last Reviewed on 2019 01:51 PM by Chyna Tapia    Tobacco: He has never smoked.              Immunizations:     Hep A, adult dose 2018         Allergies:     Last Reviewed on 2019 01:51 PM by Chyna Tapia    Penicillins:        Current Medications:     Last Reviewed on 2019 01:51 PM by Chyna Tapia April    Atenolol/Chlorthalidone 50mg/25mg Tablet Take 1/2 tablet daily     Lipitor 10mg Tablet 1 tab daily     Lisinopril 10mg Tablet Take 1 tablet(s) by mouth daily     Metformin HCl 500mg Tablets, Extended Release 1 tab daily         OBJECTIVE:        Vitals:         Current: 2019 1:51:13 PM    Ht:  6 ft, 0 in;  Wt: 222.4 lbs;  BMI: 30.2    T: 97.3 F (oral);  BP: 130/74 mm Hg (left arm, sitting);  P: 66 bpm (left arm (BP Cuff), sitting);  sCr: 1.07 mg/dL;  GFR: 86.21        Exams:     PHYSICAL EXAM:     GENERAL: Vitals recorded well developed, well nourished;   well groomed;  no apparent distress;     NECK: carotid exam reveals no bruits;     RESPIRATORY: normal respiratory rate and pattern with no distress; normal breath sounds with no rales, rhonchi, wheezes or rubs;     CARDIOVASCULAR: normal rate; rhythm is regular;  no systolic murmur;    Peripheral Pulses: posterior tibial: equal bilaterally;  no edema;     SKIN: skin of feet and toenails intact bilaterally;     NEUROLOGIC: sensation intact to monofilament bilaterally;     PSYCHIATRIC:  appropriate affect and demeanor; normal speech pattern; grossly normal memory;         Procedures:     Vaccination against hepatitis A     1. Hepatitis A (adult) given IM in the left upper arm; administered by KG;  lot number [id number]; expires 11/18/2019             ASSESSMENT           250.00   E11.9  Type 2 diabetes              DDx:     401.1   I10  Hypertension              DDx:     V05.3   Z23  Vaccination against hepatitis A              DDx:     272.0   E78.2  Hypercholesterolemia              DDx:         ORDERS:         Meds Prescribed:       Refill of: Atenolol/Chlorthalidone 50mg/25mg Tablet Take 1/2 tablet daily  #45 (Forty Five) tablet(s) Refills: 0       Refill of: Lipitor (Atorvastatin Calcium) 10mg Tablet 1 tab daily  #90 (Ninety) tablet(s) Refills: 0       Refill of: Lisinopril 10mg Tablet Take 1 tablet(s) by mouth daily  #90 (Ninety) tablet(s) Refills: 0       Refill of: Metformin HCl (Metformin HCl)  500mg Tablets, Extended Release 1 tab daily  #90 (Ninety) tablet(s) Refills: 0         Lab Orders:       FUTURE  Future order to be done at patients convenience  (Send-Out)         80806  DIAB - [facility] CMP A1C LIPID AND MICRO ALBUM CR RATIO: [id number]  (Send-Out)           Procedures Ordered:       34410  Immunization administration; one vaccine  (In-House)         36289  HepA vaccine adult dose for intramuscular use  (In-House)           Other Orders:         Calculated BMI above the upper  parameter and a follow-up plan was documented in the medical record  (In-House)                   PLAN:          Type 2 diabetes send for records from dr carson /kelsey flu vaccine /updated diabetes flow sheet     MIPS     BMI Elevated - Follow-Up Plan: He was provided education on weight loss strategies     FOLLOW-UP TESTING #1: FOLLOW-UP LABORATORY:  Labs to be scheduled in the future include Diabetes Panel 2;CMP, A1C, Lipid, Microalbumin:Creatinine Ratio.      FOLLOW-UP: fasting for labs           Prescriptions:       Refill of: Metformin HCl (Metformin HCl)  500mg Tablets, Extended Release 1 tab daily  #90 (Ninety) tablet(s) Refills: 0           Orders:       FUTURE  Future order to be done at patients convenience  (Send-Out)         [address]13  DIAB[address] [place] [address] CMP A1C LIPID AND MICRO ALBUM CR RATIO: [id number]  (Send-Out)           Calculated BMI above the upper parameter and a follow-up plan was documented in the medical record  (In-House)            Hypertension         RECOMMENDATIONS given include: perform routine monitoring of blood pressure with home blood pressure cuff.      FOLLOW-UP: Schedule a follow-up visit in 6 months.            Prescriptions:       Refill of: Atenolol/Chlorthalidone 50mg/25mg Tablet Take 1/2 tablet daily  #45 (Forty Five) tablet(s) Refills: 0       Refill of: Lisinopril 10mg Tablet Take 1 tablet(s) by mouth daily  #90 (Ninety) tablet(s) Refills: 0           Patient Education Handouts:       High Blood Pressure (HTN)           Vaccination against hepatitis A           Orders:       50690  Immunization administration; one vaccine  (In-House)         19182  HepA vaccine adult dose for intramuscular use  (In-House)            Hypercholesterolemia         RECOMMENDATIONS given include: exercise and low cholesterol/low fat diet.            Prescriptions:       Refill of: Lipitor (Atorvastatin Calcium) 10mg Tablet 1 tab daily  #90 (Ninety) tablet(s) Refills: 0              Patient Recommendations:        For  Type 2 diabetes:             The following laboratory testing has been ordered:         For  Hypertension:     Begin monitoring your blood pressure by brief nurse visits at our office, a home blood pressure monitor, or by checking on the machines in pharmacies or stores.  Keep a log of the readings.  Schedule a follow-up visit in 6 months.          For  Hypercholesterolemia:     Maintain a regular exercise program. Reduce the amount of cholesterol and saturated fat in your diet.              CHARGE CAPTURE           **Please note: ICD descriptions below are intended for billing purposes only and may not represent clinical diagnoses**        Primary Diagnosis:         250.00 Type 2 diabetes            E11.9    Type 2 diabetes mellitus without complications              Orders:          51062   Office/outpatient visit; established patient, level 4  (In-House)                Calculated BMI above the upper parameter and a follow-up plan was documented in the medical record  (In-House)           401.1 Hypertension            I10    Essential (primary) hypertension    V05.3 Vaccination against hepatitis A            Z23    Encounter for immunization              Orders:          73976   Immunization administration; one vaccine  (In-House)             57299   HepA vaccine adult dose for intramuscular use  (In-House)           272.0 Hypercholesterolemia            E78.2    Mixed hyperlipidemia

## 2021-05-18 NOTE — PROGRESS NOTES
Jared Restrepo 1965     Office/Outpatient Visit    Visit Date: Mon, Oct 7, 2019 02:43 pm    Provider: Anamaria Edwards N.P. (Assistant: Jeanie Dale MA)    Location: [facility]        Electronically signed by Anamaria Edwards N.P. on  10/07/2019 03:13:43 PM                             SUBJECTIVE:        CC:     Mr. Restrepo is a 54 year old Black or  male.  This is a follow-up visit.  check up         HPI:         Type 2 diabetes details; current meds include an oral hypoglycemic ( Glucophage XR ( 500mg qd ) ).  He reports home blood glucose readings have been excellent, with average fasting glucoses running <120 mg/dL.  Most recent lab results include Hemoglobin A1c:  6.7 (%) (02/12/2019), LDL:  77 (mg/dL) (02/12/2019), HDL:  43 (mg/dL) (02/12/2019), Triglycerides:  103 (mg/dL) (02/12/2019), Microalbuminuria:  4.0 (mg/g creat) (02/12/2019), Dilated Eye Exam by date:  08/07/2018 (02/12/2019), Foot Exam (Annual):  02/12/2019 (02/12/2019).          Additionally, he presents with history of hypertension.  his current cardiac medication regimen includes an ACE inhibitor ( Zestril ) and a combination medication ( atenolol/chlorthalidone ).  He is tolerating the medication well without side effects.  Compliance with treatment has been good; he takes his medication as directed.          Additionally, he presents with history of hypercholesterolemia.  current treatment includes Lipitor.  Compliance with treatment has been good; he takes his medication as directed.  He denies experiencing any hypercholesterolemia related symptoms.      ROS:     CONSTITUTIONAL:  Negative for chills, fatigue, fever, and weight change.      CARDIOVASCULAR:  Negative for chest pain, palpitations, tachycardia, orthopnea, and edema.      RESPIRATORY:  Negative for cough, dyspnea, and hemoptysis.      NEUROLOGICAL:  Negative for dizziness, headaches, paresthesias, and weakness.      PSYCHIATRIC:  Positive  for insomnia.  occ has an issue with sleep, tylenol pm helps         PMH/FMH/SH:     Last Reviewed on 10/07/2019 03:11 PM by Anamaria Edwards    Past Medical History:         Hypertension         Surgical History:         lipoma;     Procedures:    Colonoscopy ( 3-17-16 normal [facility] )         Family History:     Father:  at age 40's; Cause of death was pancreatitis    ; Positive for Type 2 Diabetes;     Mother:    Positive for Arrhythmia;     ; Positive for Lung Cancer;     Brother(s): 2 brother(s) total    ; Positive for Type 2 Diabetes;     Sister(s): 1 sister(s) total    ; Positive for thyroid disorder;     Daughter(s): Healthy; 1 daughter(s) total     Paternal Grandfather: Cause of death was cancer     Paternal Grandmother: Cause of death was cancer     Maternal Grandfather:      Maternal Grandmother:  Father:  at age 40's; Cause of death was pancreatitis     Mother: Lung Cancer     Son(s): 3 son(s) total; 1 ;  MVA     Daughter(s): Healthy; 1 daughter(s) total         Social History:     Occupation: [employer]  /local goes in a 2 am     Marital Status:      Children: 4 children         Tobacco/Alcohol/Supplements:     Last Reviewed on 10/07/2019 02:50 PM by Jeanie Dale    Tobacco: He has never smoked.              Immunizations:     Hep A, adult dose 2018     Hep A, adult dose 2019         Allergies:     Last Reviewed on 10/07/2019 02:50 PM by Jeanie Dale    Penicillins:        Current Medications:     Last Reviewed on 10/07/2019 02:50 PM by Jeanie Dale    Atenolol/Chlorthalidone 50mg/25mg Tablet Take 1/2 tablet daily     Lipitor 10mg Tablet 1 tab daily     Lisinopril 10mg Tablet Take 1 tablet(s) by mouth daily     Metformin HCl 500mg Tablets, Extended Release 1 tab daily         OBJECTIVE:        Vitals:         Current: 10/7/2019 2:50:22 PM    Ht:  6 ft, 0 in;  Wt: 227.6 lbs;  BMI: 30.9    T: 98.8 F (oral);  BP: 138/84 mm Hg (right  arm, sitting);  P: 72 bpm (right arm (BP Cuff), sitting);  sCr: 1.05 mg/dL;  GFR: 87.72        Exams:     PHYSICAL EXAM:     GENERAL: Vitals recorded well developed, well nourished;  well groomed;  no apparent distress;     NECK: carotid exam reveals no bruits;     RESPIRATORY: normal respiratory rate and pattern with no distress; normal breath sounds with no rales, rhonchi, wheezes or rubs;     CARDIOVASCULAR: normal rate; rhythm is regular;  no systolic murmur; no edema;     PSYCHIATRIC:  appropriate affect and demeanor; normal speech pattern; grossly normal memory;         ASSESSMENT           V79.0   Z13.31  Screening for depression              DDx:     250.00   E11.9  Type 2 diabetes              DDx:     401.1   I10  Hypertension              DDx:     272.0   E78.2  Hypercholesterolemia              DDx:         ORDERS:         Meds Prescribed:       Refill of: Metformin HCl 500mg Tablets, Extended Release 1 tab daily  #90 (Ninety) tablet(s) Refills: 1       Refill of: Atenolol/Chlorthalidone (Atenolol/Chlorthalidone)  50mg/25mg Tablet Take 1/2 tablet daily  #45 (Forty Five) tablet(s) Refills: 1       Refill of: Lipitor (Atorvastatin Calcium)  10mg Tablet 1 tab daily  #90 (Ninety) tablet(s) Refills: 1       Refill of: Lisinopril (Lisinopril)  10mg Tablet Take 1 tablet(s) by mouth daily  #90 (Ninety) tablet(s) Refills: 1         Lab Orders:       [id number]  DI[facility] [place] [address] LIPID,CMP, A1C: 66385, 86068, 38954  (Send-Out)           Other Orders:         Depression screen negative  (In-House)                   PLAN:          Screening for depression declines flu vaccine     MIPS PHQ-9 Depression Screening: Completed form scanned and in chart; Total Score 1; Negative Depression Screen           Orders:         Depression screen negative  (In-House)            Type 2 diabetes send for dr carson eye exam he had last Tuesday /reviewed diabetes flow sheet     LABORATORY:  Labs ordered to be performed today include  Diabetes Panel 1; CMP, Lipid, A1C.      RECOMMENDATIONS: test fasting sugars.            Prescriptions:       Refill of: Metformin HCl 500mg Tablets, Extended Release 1 tab daily  #90 (Ninety) tablet(s) Refills: 1           Orders:       [address]21  DI[facility] [place] [address] LIPID,CMP, A1C: 60202, 39185, 23507  (Send-Out)            Hypertension         RECOMMENDATIONS given include: perform routine monitoring of blood pressure with home blood pressure cuff.      FOLLOW-UP: Schedule a follow-up visit in 6 months.            Prescriptions:       Refill of: Atenolol/Chlorthalidone (Atenolol/Chlorthalidone)  50mg/25mg Tablet Take 1/2 tablet daily  #45 (Forty Five) tablet(s) Refills: 1       Refill of: Lisinopril (Lisinopril)  10mg Tablet Take 1 tablet(s) by mouth daily  #90 (Ninety) tablet(s) Refills: 1          Hypercholesterolemia           Prescriptions:       Refill of: Lipitor (Atorvastatin Calcium)  10mg Tablet 1 tab daily  #90 (Ninety) tablet(s) Refills: 1             Patient Recommendations:        For  Hypertension:     Begin monitoring your blood pressure by brief nurse visits at our office, a home blood pressure monitor, or by checking on the machines in pharmacies or stores.  Keep a log of the readings.  Schedule a follow-up visit in 6 months.              CHARGE CAPTURE           **Please note: ICD descriptions below are intended for billing purposes only and may not represent clinical diagnoses**        Primary Diagnosis:         V79.0 Screening for depression            Z13.31    Encounter for screening for depression              Orders:          39818   Office/outpatient visit; established patient, level 4  (In-House)                Depression screen negative  (In-House)           250.00 Type 2 diabetes            E11.9    Type 2 diabetes mellitus without complications    401.1 Hypertension            I10    Essential (primary) hypertension    272.0 Hypercholesterolemia            E78.2    Mixed hyperlipidemia

## 2021-07-01 VITALS
DIASTOLIC BLOOD PRESSURE: 84 MMHG | WEIGHT: 227.6 LBS | BODY MASS INDEX: 30.83 KG/M2 | HEIGHT: 72 IN | HEART RATE: 72 BPM | TEMPERATURE: 98.8 F | SYSTOLIC BLOOD PRESSURE: 138 MMHG

## 2021-07-01 VITALS
DIASTOLIC BLOOD PRESSURE: 74 MMHG | HEIGHT: 72 IN | TEMPERATURE: 97.3 F | WEIGHT: 222.4 LBS | SYSTOLIC BLOOD PRESSURE: 130 MMHG | HEART RATE: 66 BPM | BODY MASS INDEX: 30.12 KG/M2

## 2021-07-01 VITALS
SYSTOLIC BLOOD PRESSURE: 119 MMHG | WEIGHT: 220.6 LBS | TEMPERATURE: 98.5 F | HEIGHT: 72 IN | HEART RATE: 61 BPM | DIASTOLIC BLOOD PRESSURE: 64 MMHG | BODY MASS INDEX: 29.88 KG/M2

## 2021-07-02 VITALS
HEIGHT: 72 IN | BODY MASS INDEX: 30.31 KG/M2 | TEMPERATURE: 97.3 F | SYSTOLIC BLOOD PRESSURE: 124 MMHG | WEIGHT: 223.8 LBS | HEART RATE: 68 BPM | DIASTOLIC BLOOD PRESSURE: 83 MMHG

## 2021-08-02 ENCOUNTER — LAB (OUTPATIENT)
Dept: LAB | Facility: HOSPITAL | Age: 56
End: 2021-08-02

## 2021-08-02 ENCOUNTER — OFFICE VISIT (OUTPATIENT)
Dept: FAMILY MEDICINE CLINIC | Age: 56
End: 2021-08-02

## 2021-08-02 VITALS
DIASTOLIC BLOOD PRESSURE: 70 MMHG | SYSTOLIC BLOOD PRESSURE: 131 MMHG | TEMPERATURE: 98.4 F | WEIGHT: 213.6 LBS | HEART RATE: 56 BPM | HEIGHT: 72 IN | BODY MASS INDEX: 28.93 KG/M2

## 2021-08-02 DIAGNOSIS — E78.2 MIXED HYPERLIPIDEMIA: ICD-10-CM

## 2021-08-02 DIAGNOSIS — I10 ESSENTIAL (PRIMARY) HYPERTENSION: Primary | ICD-10-CM

## 2021-08-02 DIAGNOSIS — E11.9 TYPE 2 DIABETES MELLITUS WITHOUT COMPLICATION, WITHOUT LONG-TERM CURRENT USE OF INSULIN (HCC): ICD-10-CM

## 2021-08-02 LAB
ALBUMIN SERPL-MCNC: 4.4 G/DL (ref 3.5–5.2)
ALBUMIN UR-MCNC: <1.2 MG/DL
ALBUMIN/GLOB SERPL: 1.5 G/DL
ALP SERPL-CCNC: 86 U/L (ref 39–117)
ALT SERPL W P-5'-P-CCNC: 22 U/L (ref 1–41)
ANION GAP SERPL CALCULATED.3IONS-SCNC: 9.6 MMOL/L (ref 5–15)
AST SERPL-CCNC: 20 U/L (ref 1–40)
BILIRUB SERPL-MCNC: 1 MG/DL (ref 0–1.2)
BUN SERPL-MCNC: 16 MG/DL (ref 6–20)
BUN/CREAT SERPL: 17.2 (ref 7–25)
CALCIUM SPEC-SCNC: 9.3 MG/DL (ref 8.6–10.5)
CHLORIDE SERPL-SCNC: 97 MMOL/L (ref 98–107)
CHOLEST SERPL-MCNC: 153 MG/DL (ref 0–200)
CO2 SERPL-SCNC: 28.4 MMOL/L (ref 22–29)
CREAT SERPL-MCNC: 0.93 MG/DL (ref 0.76–1.27)
CREAT UR-MCNC: 181.9 MG/DL
GFR SERPL CREATININE-BSD FRML MDRD: 102 ML/MIN/1.73
GLOBULIN UR ELPH-MCNC: 3 GM/DL
GLUCOSE SERPL-MCNC: 158 MG/DL (ref 65–99)
HBA1C MFR BLD: 6.6 % (ref 4.8–5.6)
HDLC SERPL-MCNC: 45 MG/DL (ref 40–60)
LDLC SERPL CALC-MCNC: 85 MG/DL (ref 0–100)
LDLC/HDLC SERPL: 1.83 {RATIO}
MICROALBUMIN/CREAT UR: NORMAL MG/G{CREAT}
POTASSIUM SERPL-SCNC: 3.8 MMOL/L (ref 3.5–5.2)
PROT SERPL-MCNC: 7.4 G/DL (ref 6–8.5)
SODIUM SERPL-SCNC: 135 MMOL/L (ref 136–145)
TRIGL SERPL-MCNC: 129 MG/DL (ref 0–150)
VLDLC SERPL-MCNC: 23 MG/DL (ref 5–40)

## 2021-08-02 PROCEDURE — 82570 ASSAY OF URINE CREATININE: CPT

## 2021-08-02 PROCEDURE — 99214 OFFICE O/P EST MOD 30 MIN: CPT | Performed by: NURSE PRACTITIONER

## 2021-08-02 PROCEDURE — 80061 LIPID PANEL: CPT

## 2021-08-02 PROCEDURE — 83036 HEMOGLOBIN GLYCOSYLATED A1C: CPT

## 2021-08-02 PROCEDURE — 80053 COMPREHEN METABOLIC PANEL: CPT

## 2021-08-02 PROCEDURE — 36415 COLL VENOUS BLD VENIPUNCTURE: CPT

## 2021-08-02 PROCEDURE — 82043 UR ALBUMIN QUANTITATIVE: CPT

## 2021-08-02 RX ORDER — ATENOLOL AND CHLORTHALIDONE TABLET 50; 25 MG/1; MG/1
1 TABLET ORAL DAILY
Qty: 90 TABLET | Refills: 1 | Status: SHIPPED | OUTPATIENT
Start: 2021-08-02 | End: 2022-01-17 | Stop reason: SDUPTHER

## 2021-08-02 RX ORDER — ATORVASTATIN CALCIUM 10 MG/1
10 TABLET, FILM COATED ORAL DAILY
COMMUNITY
End: 2021-08-02 | Stop reason: SDUPTHER

## 2021-08-02 RX ORDER — LISINOPRIL 10 MG/1
10 TABLET ORAL DAILY
COMMUNITY
End: 2021-08-02 | Stop reason: SDUPTHER

## 2021-08-02 RX ORDER — ATENOLOL AND CHLORTHALIDONE TABLET 50; 25 MG/1; MG/1
1 TABLET ORAL DAILY
COMMUNITY
End: 2021-08-02 | Stop reason: SDUPTHER

## 2021-08-02 RX ORDER — METFORMIN HYDROCHLORIDE 750 MG/1
750 TABLET, EXTENDED RELEASE ORAL
COMMUNITY
End: 2021-08-02 | Stop reason: SDUPTHER

## 2021-08-02 RX ORDER — ATORVASTATIN CALCIUM 10 MG/1
10 TABLET, FILM COATED ORAL DAILY
Qty: 90 TABLET | Refills: 1 | Status: SHIPPED | OUTPATIENT
Start: 2021-08-02 | End: 2021-12-27

## 2021-08-02 RX ORDER — METFORMIN HYDROCHLORIDE 750 MG/1
750 TABLET, EXTENDED RELEASE ORAL
Qty: 90 TABLET | Refills: 1 | Status: SHIPPED | OUTPATIENT
Start: 2021-08-02 | End: 2021-12-27

## 2021-08-02 RX ORDER — LISINOPRIL 10 MG/1
10 TABLET ORAL DAILY
Qty: 90 TABLET | Refills: 1 | Status: SHIPPED | OUTPATIENT
Start: 2021-08-02 | End: 2021-12-27

## 2021-08-02 NOTE — PROGRESS NOTES
Jared Alas Kaye presents to [facility].    Chief Complaint:  Hypertension and Hyperlipidemia         History of Present Illness:  Hypertension:  Current medication: tenoretic, Lisinopril   Tolerating Medication: Yes  Checking BP at home and it is: 120/70's  Needs refills: Yes to [facility]   Labs   Lab Results       Component                Value               Date                       BUN                      16                  01/07/2021                 CREATININE               1.02                01/07/2021                 BCR                      16                  01/07/2021                 K                        3.8                 01/07/2021                 CO2                      25                  01/07/2021                 CALCIUM                  9.6                 01/07/2021                 ALBUMIN                  4.6                 01/07/2021                 LABIL2                   1.5                 01/07/2021                 AST                      22                  01/07/2021                 ALT                      33                  01/07/2021              Hyperlipidemia  Current medication : lipitor  Tolerating medication: Yes  Needs Refill: Yes    Lab Results       Component                Value               Date                       CHLPL                    164                 01/07/2021                 TRIG                     106                 01/07/2021                 HDL                      44                  01/07/2021                 LDL                      99                  01/07/2021              Diabetes:  Current medication : metformin  Tolerating medication: Yes  Last eye exam 7-2-2021 Dr Gongs / no DR LOCKETT  Last foot exam 2-1-21  At home BS ranges: 120-130  Lab Results       Component                Value               Date                       HGBA1C                   7.7 (H)             01/07/2021            PMH: no  "changes, works as a  for [employer]                    Review of Systems:  Review of Systems   Constitutional: Negative for fatigue and fever.   Respiratory: Negative for cough and shortness of breath.    Cardiovascular: Negative for chest pain, palpitations and leg swelling.   Neurological: Negative for numbness.          Vital Signs:   /70 (BP Location: Right arm, Patient Position: Sitting)   Pulse 56   Temp 98.4 °F (36.9 °C) (Oral)   Ht 182.9 cm (72\")   Wt 96.9 kg (213 lb 9.6 oz)   BMI 28.97 kg/m²       Physical Exam:  Physical Exam  Vitals reviewed.   Constitutional:       General: He is not in acute distress.     Appearance: Normal appearance.   Neck:      Vascular: No carotid bruit.   Cardiovascular:      Rate and Rhythm: Normal rate and regular rhythm.      Heart sounds: Normal heart sounds. No murmur heard.     Pulmonary:      Effort: Pulmonary effort is normal. No respiratory distress.      Breath sounds: Normal breath sounds.   Musculoskeletal:      Right lower leg: No edema.      Left lower leg: No edema.   Neurological:      Mental Status: He is alert.   Psychiatric:         Mood and Affect: Mood normal.         Behavior: Behavior normal.         Result Review   {Labs  Result Review  Imaging  Med Tab  Media  Procedures :23}   The following data was reviewed by: CHRISTELLE Mar on 08/02/2021:    Results for orders placed or performed in visit on 07/24/21    COLONOSCOPY   Result Value Ref Range     Colonoscopy SEE REPORT                Assessment and Plan:          Diagnoses and all orders for this visit:    1. Essential (primary) hypertension (Primary)  Assessment & Plan:  Hypertension is stable. Continue to monitor BP at home. Continue current meds. Continue to modify diet and lifestyle. Will need labs every 6 months and follow up.       Orders:  -     lisinopril (PRINIVIL,ZESTRIL) 10 MG tablet; Take 1 tablet by mouth Daily.  Dispense: 90 tablet; Refill: 1  -     " atenolol-chlorthalidone (TENORETIC) 50-25 MG per tablet; Take 1 tablet by mouth Daily. 1/2  Tablet daily  Dispense: 90 tablet; Refill: 1    2. Mixed hyperlipidemia  Assessment & Plan:  Continue current medication and efforts with diet and exercise.     Orders:  -     atorvastatin (LIPITOR) 10 MG tablet; Take 1 tablet by mouth Daily.  Dispense: 90 tablet; Refill: 1    3. Type 2 diabetes mellitus without complication, without long-term current use of insulin (CMS/[facility])  Assessment & Plan:  To work on diet, regular exercise, monitor sugars, check feet daily, see optometrist yearly or as directed.      Orders:  -     Comprehensive Metabolic Panel; Future  -     Lipid Panel; Future  -     Hemoglobin A1c; Future  -     Microalbumin / Creatinine Urine Ratio - Urine, Clean Catch; Future  -     metFORMIN ER (GLUCOPHAGE-XR) 750 MG 24 hr tablet; Take 1 tablet by mouth Daily With Breakfast.  Dispense: 90 tablet; Refill: 1        Follow Up   Return for followup pending lab results.  Patient was given instructions and counseling regarding his condition or for health maintenance advice. Please see specific information pulled into the AVS if appropriate.

## 2021-08-02 NOTE — ASSESSMENT & PLAN NOTE
Hypertension is stable. Continue to monitor BP at home. Continue current meds. Continue to modify diet and lifestyle. Will need labs every 6 months and follow up.

## 2021-08-02 NOTE — ASSESSMENT & PLAN NOTE
To work on diet, regular exercise, monitor sugars, check feet daily, see optometrist yearly or as directed.

## 2021-08-05 ENCOUNTER — TELEPHONE (OUTPATIENT)
Dept: FAMILY MEDICINE CLINIC | Age: 56
End: 2021-08-05

## 2021-08-05 NOTE — TELEPHONE ENCOUNTER
Caller[facility] [place] [place] - [address] AT PORTAL TO REGISTERED [facility] - [phone] [facility] [phone] FX    Relationship to patient: Pharmacy    Best call back number: [phone] REFERENCE # [id number]    Patient is needing: PHARMACY IS NEEDING HELP WITH THE INSTRUCTIONS FOR PATIENT'S MEDICINE. PLEASE CALL AND ADVISE.         \

## 2021-08-09 NOTE — TELEPHONE ENCOUNTER
Tried to call [facility] pharmacy back three times and vm kept disconnecting the call.

## 2021-08-10 NOTE — TELEPHONE ENCOUNTER
[facility] received a order for Atenolol Chlorthalidone 50/25mg one daily, 1/2 daily. They need to know if its one tablet or 1/2 tablet. On pts hx it states 1/2 daily.

## 2021-12-06 ENCOUNTER — CLINICAL SUPPORT (OUTPATIENT)
Dept: FAMILY MEDICINE CLINIC | Age: 56
End: 2021-12-06

## 2021-12-06 DIAGNOSIS — Z23 NEED FOR COVID-19 VACCINE: Primary | ICD-10-CM

## 2021-12-06 PROCEDURE — 91300 COVID-19 (PFIZER): CPT | Performed by: FAMILY MEDICINE

## 2021-12-06 PROCEDURE — 0003A COVID-19 (PFIZER): CPT | Performed by: FAMILY MEDICINE

## 2021-12-06 NOTE — PROGRESS NOTES
I have reviewed the notes, assessments, and/or procedures performed by Marsha Shaffer LPN, and I concur with her documentation of Jared Restrepo.

## 2021-12-24 DIAGNOSIS — E11.9 TYPE 2 DIABETES MELLITUS WITHOUT COMPLICATION, WITHOUT LONG-TERM CURRENT USE OF INSULIN (HCC): ICD-10-CM

## 2021-12-24 DIAGNOSIS — E78.2 MIXED HYPERLIPIDEMIA: ICD-10-CM

## 2021-12-24 DIAGNOSIS — I10 ESSENTIAL (PRIMARY) HYPERTENSION: ICD-10-CM

## 2021-12-27 RX ORDER — METFORMIN HYDROCHLORIDE 750 MG/1
750 TABLET, EXTENDED RELEASE ORAL
Qty: 90 TABLET | Refills: 0 | Status: SHIPPED | OUTPATIENT
Start: 2021-12-27 | End: 2022-03-28

## 2021-12-27 RX ORDER — LISINOPRIL 10 MG/1
10 TABLET ORAL DAILY
Qty: 90 TABLET | Refills: 0 | Status: SHIPPED | OUTPATIENT
Start: 2021-12-27 | End: 2022-03-28

## 2021-12-27 RX ORDER — ATORVASTATIN CALCIUM 10 MG/1
10 TABLET, FILM COATED ORAL NIGHTLY
Qty: 90 TABLET | Refills: 0 | Status: SHIPPED | OUTPATIENT
Start: 2021-12-27 | End: 2022-03-28

## 2021-12-27 NOTE — TELEPHONE ENCOUNTER
Rx Refill Note  Requested Prescriptions     Pending Prescriptions Disp Refills   • atorvastatin (LIPITOR) 10 MG tablet [Pharmacy Med Name: ATORVASTATIN TAB 10MG] 90 tablet 1     Sig: TAKE 1 TABLET DAILY   • lisinopril (PRINIVIL,ZESTRIL) 10 MG tablet [Pharmacy Med Name: LISINOPRIL TAB 10MG] 30 tablet 0     Sig: TAKE 1 TABLET DAILY   • metFORMIN ER (GLUCOPHAGE-XR) 750 MG 24 hr tablet [Pharmacy Med Name: METFORMIN ER TAB 750MG GP] 30 tablet 0     Sig: TAKE 1 TABLET ONCE DAILY   WITH THE EVENING MEAL      Last office visit with prescribing clinician: 8/2/2021      Next office visit with prescribing clinician: 1/10/2022   Lab: Comprehensive Metabolic Panel (08/02/2021 10:02)Lipid Panel (08/02/2021 10:02)Hemoglobin A1c (08/02/2021 10:02)        Marie Monroe LPN  12/27/21, 11:27 EST

## 2022-01-17 ENCOUNTER — LAB (OUTPATIENT)
Dept: LAB | Facility: HOSPITAL | Age: 57
End: 2022-01-17

## 2022-01-17 ENCOUNTER — TELEMEDICINE (OUTPATIENT)
Dept: FAMILY MEDICINE CLINIC | Age: 57
End: 2022-01-17

## 2022-01-17 DIAGNOSIS — E11.9 TYPE 2 DIABETES MELLITUS WITHOUT COMPLICATION, WITHOUT LONG-TERM CURRENT USE OF INSULIN: ICD-10-CM

## 2022-01-17 DIAGNOSIS — H69.81 ETD (EUSTACHIAN TUBE DYSFUNCTION), RIGHT: ICD-10-CM

## 2022-01-17 DIAGNOSIS — E78.2 MIXED HYPERLIPIDEMIA: ICD-10-CM

## 2022-01-17 DIAGNOSIS — I10 ESSENTIAL (PRIMARY) HYPERTENSION: Primary | ICD-10-CM

## 2022-01-17 DIAGNOSIS — I10 ESSENTIAL (PRIMARY) HYPERTENSION: ICD-10-CM

## 2022-01-17 PROBLEM — H69.91 ETD (EUSTACHIAN TUBE DYSFUNCTION), RIGHT: Status: ACTIVE | Noted: 2022-01-17

## 2022-01-17 LAB
ALBUMIN SERPL-MCNC: 4.7 G/DL (ref 3.5–5.2)
ALBUMIN/GLOB SERPL: 1.7 G/DL
ALP SERPL-CCNC: 83 U/L (ref 39–117)
ALT SERPL W P-5'-P-CCNC: 24 U/L (ref 1–41)
ANION GAP SERPL CALCULATED.3IONS-SCNC: 9.1 MMOL/L (ref 5–15)
AST SERPL-CCNC: 23 U/L (ref 1–40)
BILIRUB SERPL-MCNC: 0.6 MG/DL (ref 0–1.2)
BUN SERPL-MCNC: 11 MG/DL (ref 6–20)
BUN/CREAT SERPL: 11.6 (ref 7–25)
CALCIUM SPEC-SCNC: 9.7 MG/DL (ref 8.6–10.5)
CHLORIDE SERPL-SCNC: 97 MMOL/L (ref 98–107)
CHOLEST SERPL-MCNC: 143 MG/DL (ref 0–200)
CO2 SERPL-SCNC: 30.9 MMOL/L (ref 22–29)
CREAT SERPL-MCNC: 0.95 MG/DL (ref 0.76–1.27)
GFR SERPL CREATININE-BSD FRML MDRD: 99 ML/MIN/1.73
GLOBULIN UR ELPH-MCNC: 2.7 GM/DL
GLUCOSE SERPL-MCNC: 103 MG/DL (ref 65–99)
HBA1C MFR BLD: 6.5 % (ref 4.8–5.6)
HDLC SERPL-MCNC: 50 MG/DL (ref 40–60)
LDLC SERPL CALC-MCNC: 65 MG/DL (ref 0–100)
LDLC/HDLC SERPL: 1.2 {RATIO}
POTASSIUM SERPL-SCNC: 4.1 MMOL/L (ref 3.5–5.2)
PROT SERPL-MCNC: 7.4 G/DL (ref 6–8.5)
SODIUM SERPL-SCNC: 137 MMOL/L (ref 136–145)
TRIGL SERPL-MCNC: 166 MG/DL (ref 0–150)
VLDLC SERPL-MCNC: 28 MG/DL (ref 5–40)

## 2022-01-17 PROCEDURE — 36415 COLL VENOUS BLD VENIPUNCTURE: CPT

## 2022-01-17 PROCEDURE — 99214 OFFICE O/P EST MOD 30 MIN: CPT | Performed by: NURSE PRACTITIONER

## 2022-01-17 PROCEDURE — 80061 LIPID PANEL: CPT

## 2022-01-17 PROCEDURE — 83036 HEMOGLOBIN GLYCOSYLATED A1C: CPT

## 2022-01-17 PROCEDURE — 80053 COMPREHEN METABOLIC PANEL: CPT

## 2022-01-17 RX ORDER — ATENOLOL AND CHLORTHALIDONE TABLET 50; 25 MG/1; MG/1
0.5 TABLET ORAL DAILY
Qty: 45 TABLET | Refills: 1 | Status: SHIPPED | OUTPATIENT
Start: 2022-01-17 | End: 2022-06-08

## 2022-01-17 NOTE — ASSESSMENT & PLAN NOTE
To work on diet, regular exercise, monitor sugars, check feet daily, see optometrist yearly or as directed.  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  He will bring me a copy)

## 2022-01-17 NOTE — PROGRESS NOTES
Jared Restrepo presents to [facility].    Chief Complaint:  Hypertension (dox video [phone])  Agrees to video visit       History of Present Illness:  Hypertension:  Current medication : tenoretic and Lisinopril   Tolerating Medication: Yes  Checking BP at home and it is 122/83  Needs refills: Yes on tenoretic  Labs   Lab Results       Component                Value               Date                       GLUCOSE                  158 (H)             08/02/2021                 BUN                      16                  08/02/2021                 CREATININE               0.93                08/02/2021                 EGFRIFAFRI               102                 08/02/2021                 BCR                      17.2                08/02/2021                 K                        3.8                 08/02/2021                 CO2                      28.4                08/02/2021                 CALCIUM                  9.3                 08/02/2021                 ALBUMIN                  4.40                08/02/2021                 LABIL2                   1.5                 01/07/2021                 AST                      20                  08/02/2021                 ALT                      22                  08/02/2021              Hyperlipidemia  Current medication: lipitor   Tolerating medication: Yes  Needs Refill: No    Lab Results       Component                Value               Date                       CHOL                     153                 08/02/2021                 CHLPL                    164                 01/07/2021                 TRIG                     129                 08/02/2021                 HDL                      45                  08/02/2021                 LDL                      85                  08/02/2021             Diabetes:  Current medication: metformin   Tolerating medication: Yes  Last eye exam :    Last foot exam   At home BS ranges: <110  And 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  Lab Results       Component                Value               Date                       HGBA1C                   6.60 (H)            2021              Past Medical History: changes since : none         Hypertension         Surgical History:         lipoma;     Procedures:    Colonoscopy ( 3-17-16 normal [facility] )         Family History:     Father:  at age 40's; Cause of death was pancreatitis    ; Positive for Type 2 Diabetes;     Mother:    Positive for Arrhythmia;     ; Positive for Lung Cancer;     Brother(s): 2 brother(s) total    ; Positive for Type 2 Diabetes;     Sister(s): 1 sister(s) total    ; Positive for thyroid disorder;     Daughter(s): Healthy; 1 daughter(s) total     Paternal Grandfather: Cause of death was cancer     Paternal Grandmother: Cause of death was cancer     Maternal Grandfather:      Maternal Grandmother:  Father:  at age 40's; Cause of death was pancreatitis     Mother: Lung Cancer     Son(s): 3 son(s) total; 1 ;  MVA     Daughter(s): Healthy; 1 daughter(s) total         Social History:     Occupation: [employer] 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   HENT: Positive for ear pain.         Right ear stopped up since [place] from [place]    Respiratory: Negative for cough and shortness of breath.    Cardiovascular: Negative for chest pain, palpitations and leg swelling.   Neurological: Negative for numbness.          Current Outpatient Medications:   •  atenolol-chlorthalidone (TENORETIC) 50-25 MG per tablet, Take 0.5 tablets by mouth Daily. 1/2  Tablet daily, Disp: 45 tablet, Rfl: 1  •  atorvastatin (LIPITOR) 10 MG tablet, Take 1 tablet by mouth  Every Night., Disp: 90 tablet, Rfl: 0  •  lisinopril (PRINIVIL,ZESTRIL) 10 MG tablet, Take 1 tablet by mouth Daily., Disp: 90 tablet, Rfl: 0  •  metFORMIN ER (GLUCOPHAGE-XR) 750 MG 24 hr tablet, Take 1 tablet by mouth Daily With Breakfast., Disp: 90 tablet, Rfl: 0    Vital Signs:   There were no vitals filed for this visit.      Physical Exam:  Physical Exam  Constitutional:       General: He is not in acute distress.  Pulmonary:      Effort: No respiratory distress.   Neurological:      Mental Status: He is alert and oriented to person, place, and time.   Psychiatric:         Mood and Affect: Mood normal.         Behavior: Behavior normal.         Result Review      The following data was reviewed by: CHRISTELLE Mar on 01/17/2022:    Results for orders placed or performed in visit on 08/02/21   Comprehensive Metabolic Panel    Specimen: Blood   Result Value Ref Range    Glucose 158 (H) 65 - 99 mg/dL    BUN 16 6 - 20 mg/dL    Creatinine 0.93 0.76 - 1.27 mg/dL    Sodium 135 (L) 136 - 145 mmol/L    Potassium 3.8 3.5 - 5.2 mmol/L    Chloride 97 (L) 98 - 107 mmol/L    CO2 28.4 22.0 - 29.0 mmol/L    Calcium 9.3 8.6 - 10.5 mg/dL    Total Protein 7.4 6.0 - 8.5 g/dL    Albumin 4.40 3.50 - 5.20 g/dL    ALT (SGPT) 22 1 - 41 U/L    AST (SGOT) 20 1 - 40 U/L    Alkaline Phosphatase 86 39 - 117 U/L    Total Bilirubin 1.0 0.0 - 1.2 mg/dL    eGFR  African Amer 102 >60 mL/min/1.73    Globulin 3.0 gm/dL    A/G Ratio 1.5 g/dL    BUN/Creatinine Ratio 17.2 7.0 - 25.0    Anion Gap 9.6 5.0 - 15.0 mmol/L   Lipid Panel    Specimen: Blood   Result Value Ref Range    Total Cholesterol 153 0 - 200 mg/dL    Triglycerides 129 0 - 150 mg/dL    HDL Cholesterol 45 40 - 60 mg/dL    LDL Cholesterol  85 0 - 100 mg/dL    VLDL Cholesterol 23 5 - 40 mg/dL    LDL/HDL Ratio 1.83    Hemoglobin A1c    Specimen: Blood   Result Value Ref Range    Hemoglobin A1C 6.60 (H) 4.80 - 5.60 %   Microalbumin / Creatinine Urine Ratio - Urine, Clean Catch     Specimen: Urine, Clean Catch   Result Value Ref Range    Microalbumin/Creatinine Ratio      Creatinine, Urine 181.9 mg/dL    Microalbumin, Urine <1.2 mg/dL               Assessment and Plan:          Diagnoses and all orders for this visit:    1. Essential (primary) hypertension (Primary)  Assessment & Plan:  Hypertension is stable. Continue to monitor BP at home. Continue current meds. Continue to modify diet and lifestyle. Will need labs every 6 months and follow up.       Orders:  -     Comprehensive Metabolic Panel; Future  -     Lipid Panel; Future  -     Hemoglobin A1c; Future  -     atenolol-chlorthalidone (TENORETIC) 50-25 MG per tablet; Take 0.5 tablets by mouth Daily. 1/2  Tablet daily  Dispense: 45 tablet; Refill: 1    2. Mixed hyperlipidemia  Assessment & Plan:  Continue current medication and efforts with diet and exercise.       Orders:  -     Comprehensive Metabolic Panel; Future  -     Lipid Panel; Future  -     Hemoglobin A1c; Future    3. Type 2 diabetes mellitus without complication, without long-term current use of insulin (HCC)  Assessment & Plan:  To work on diet, regular exercise, monitor sugars, check feet daily, see optometrist yearly or as directed.  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  He will bring me a copy)    Orders:  -     Comprehensive Metabolic Panel; Future  -     Lipid Panel; Future  -     Hemoglobin A1c; Future    4. ETD (Eustachian tube dysfunction), right  Assessment & Plan:  Try frequent ear popping and flonase OTC,  if not improving, come in           Follow Up   Return if symptoms worsen or fail to improve, for he will come into our lab today, he  has fasted .  Patient was given instructions and counseling regarding his condition or for health maintenance advice. Please see specific information pulled into the AVS if appropriate.

## 2022-03-27 DIAGNOSIS — I10 ESSENTIAL (PRIMARY) HYPERTENSION: ICD-10-CM

## 2022-03-27 DIAGNOSIS — E78.2 MIXED HYPERLIPIDEMIA: ICD-10-CM

## 2022-03-27 DIAGNOSIS — E11.9 TYPE 2 DIABETES MELLITUS WITHOUT COMPLICATION, WITHOUT LONG-TERM CURRENT USE OF INSULIN: ICD-10-CM

## 2022-03-28 RX ORDER — ATORVASTATIN CALCIUM 10 MG/1
TABLET, FILM COATED ORAL
Qty: 30 TABLET | Refills: 0 | Status: SHIPPED | OUTPATIENT
Start: 2022-03-28 | End: 2022-06-08

## 2022-03-28 RX ORDER — LISINOPRIL 10 MG/1
TABLET ORAL
Qty: 90 TABLET | Refills: 0 | Status: SHIPPED | OUTPATIENT
Start: 2022-03-28 | End: 2022-06-08

## 2022-03-28 RX ORDER — METFORMIN HYDROCHLORIDE 750 MG/1
TABLET, EXTENDED RELEASE ORAL
Qty: 90 TABLET | Refills: 0 | Status: SHIPPED | OUTPATIENT
Start: 2022-03-28 | End: 2022-06-08

## 2022-06-07 DIAGNOSIS — E11.9 TYPE 2 DIABETES MELLITUS WITHOUT COMPLICATION, WITHOUT LONG-TERM CURRENT USE OF INSULIN: ICD-10-CM

## 2022-06-07 DIAGNOSIS — E78.2 MIXED HYPERLIPIDEMIA: ICD-10-CM

## 2022-06-07 DIAGNOSIS — I10 ESSENTIAL (PRIMARY) HYPERTENSION: ICD-10-CM

## 2022-06-08 RX ORDER — LISINOPRIL 10 MG/1
TABLET ORAL
Qty: 90 TABLET | Refills: 0 | Status: SHIPPED | OUTPATIENT
Start: 2022-06-08 | End: 2022-09-23

## 2022-06-08 RX ORDER — ATENOLOL AND CHLORTHALIDONE TABLET 50; 25 MG/1; MG/1
TABLET ORAL
Qty: 45 TABLET | Refills: 0 | Status: SHIPPED | OUTPATIENT
Start: 2022-06-08 | End: 2022-09-23

## 2022-06-08 RX ORDER — METFORMIN HYDROCHLORIDE 750 MG/1
TABLET, EXTENDED RELEASE ORAL
Qty: 90 TABLET | Refills: 0 | Status: SHIPPED | OUTPATIENT
Start: 2022-06-08 | End: 2022-09-23

## 2022-06-08 RX ORDER — ATORVASTATIN CALCIUM 10 MG/1
TABLET, FILM COATED ORAL
Qty: 90 TABLET | Refills: 0 | Status: SHIPPED | OUTPATIENT
Start: 2022-06-08 | End: 2022-09-23

## 2022-06-08 NOTE — TELEPHONE ENCOUNTER
Rx Refill Note  Requested Prescriptions     Pending Prescriptions Disp Refills   • metFORMIN ER (GLUCOPHAGE-XR) 750 MG 24 hr tablet [Pharmacy Med Name: METFORMIN ER TAB 750MG GP] 90 tablet 0     Sig: TAKE 1 TABLET DAILY WITH   BREAKFAST   • lisinopril (PRINIVIL,ZESTRIL) 10 MG tablet [Pharmacy Med Name: LISINOPRIL TAB 10MG] 90 tablet 0     Sig: TAKE 1 TABLET DAILY   • atenolol-chlorthalidone (TENORETIC) 50-25 MG per tablet [Pharmacy Med Name: ATENOL/CHLOR TAB 50-25MG] 45 tablet 1     Sig: TAKE 1/2 TABLET DAILY   • atorvastatin (LIPITOR) 10 MG tablet [Pharmacy Med Name: ATORVASTATIN TAB 10MG] 90 tablet 0     Sig: TAKE 1 TABLET EVERY NIGHT      Last office visit with prescribing clinician: 01/17/22, telehealth  Next office visit with prescribing clinician: Visit date not found    Marie Monore LPN  06/08/22, 09:20 EDT

## 2022-09-22 DIAGNOSIS — I10 ESSENTIAL (PRIMARY) HYPERTENSION: ICD-10-CM

## 2022-09-22 DIAGNOSIS — E78.2 MIXED HYPERLIPIDEMIA: ICD-10-CM

## 2022-09-22 DIAGNOSIS — E11.9 TYPE 2 DIABETES MELLITUS WITHOUT COMPLICATION, WITHOUT LONG-TERM CURRENT USE OF INSULIN: ICD-10-CM

## 2022-09-23 RX ORDER — ATENOLOL AND CHLORTHALIDONE TABLET 50; 25 MG/1; MG/1
TABLET ORAL
Qty: 45 TABLET | Refills: 0 | Status: SHIPPED | OUTPATIENT
Start: 2022-09-23 | End: 2022-12-19

## 2022-09-23 RX ORDER — LISINOPRIL 10 MG/1
TABLET ORAL
Qty: 90 TABLET | Refills: 0 | Status: SHIPPED | OUTPATIENT
Start: 2022-09-23 | End: 2022-12-14

## 2022-09-23 RX ORDER — ATORVASTATIN CALCIUM 10 MG/1
TABLET, FILM COATED ORAL
Qty: 90 TABLET | Refills: 0 | Status: SHIPPED | OUTPATIENT
Start: 2022-09-23 | End: 2022-12-14

## 2022-09-23 RX ORDER — METFORMIN HYDROCHLORIDE 750 MG/1
TABLET, EXTENDED RELEASE ORAL
Qty: 90 TABLET | Refills: 0 | Status: SHIPPED | OUTPATIENT
Start: 2022-09-23 | End: 2022-12-14

## 2022-09-23 NOTE — TELEPHONE ENCOUNTER
Rx Refill Note  Requested Prescriptions     Pending Prescriptions Disp Refills   • atorvastatin (LIPITOR) 10 MG tablet [Pharmacy Med Name: ATORVASTATIN TAB 10MG] 30 tablet 0     Sig: TAKE 1 TABLET DAILY   • metFORMIN ER (GLUCOPHAGE-XR) 750 MG 24 hr tablet [Pharmacy Med Name: METFORMIN ER TAB 750MG GP] 90 tablet 0     Sig: TAKE 1 TABLET DAILY WITH   BREAKFAST   • atenolol-chlorthalidone (TENORETIC) 50-25 MG per tablet [Pharmacy Med Name: ATENOL/CHLOR TAB 50-25MG] 45 tablet 0     Sig: TAKE 1/2 TABLET DAILY   • lisinopril (PRINIVIL,ZESTRIL) 10 MG tablet [Pharmacy Med Name: LISINOPRIL TAB 10MG] 90 tablet 0     Sig: TAKE 1 TABLET DAILY      Last office visit with prescribing clinician: 01/17/22, telehealth    Next office visit with prescribing clinician: Visit date not found  Lipid Panel (01/17/2022 14:37)  Comprehensive Metabolic Panel (01/17/2022 14:37)    Marie Monroe LPN  09/23/22, 09:39 EDT

## 2022-09-23 NOTE — TELEPHONE ENCOUNTER
Can you get him on the phone, get him in here or scheduled to come in and then send him 90 days of each refill he needs and he will need labs

## 2022-09-26 ENCOUNTER — TELEMEDICINE (OUTPATIENT)
Dept: FAMILY MEDICINE CLINIC | Age: 57
End: 2022-09-26

## 2022-09-26 DIAGNOSIS — I10 ESSENTIAL (PRIMARY) HYPERTENSION: ICD-10-CM

## 2022-09-26 DIAGNOSIS — E78.2 MIXED HYPERLIPIDEMIA: Primary | ICD-10-CM

## 2022-09-26 DIAGNOSIS — E11.9 TYPE 2 DIABETES MELLITUS WITHOUT COMPLICATION, WITHOUT LONG-TERM CURRENT USE OF INSULIN: ICD-10-CM

## 2022-09-26 PROCEDURE — 99213 OFFICE O/P EST LOW 20 MIN: CPT | Performed by: NURSE PRACTITIONER

## 2022-09-26 NOTE — ASSESSMENT & PLAN NOTE
To continue to work on diet, regular exercise, monitor sugars, check feet daily, get his optometrist appt set up, he is past due

## 2022-09-26 NOTE — PROGRESS NOTES
Mode of Visit: Video  You have chosen to receive care through a telehealth visit.  Do you consent to use a video/audio connection for your medical care today? Yes   The visit included audio and video interaction. No technical issues occurred during this visit.    Subjective       Chief Complaint   Patient presents with   • [employer] VIDEO     [phone]   • Hypertension   • Hyperlipidemia   • Diabetes   • Follow-up         HPI:    Jared Restrepo is a 57 y.o. male who presents to    [facility] today     Diabetes:  Current medication: metformin   Tolerating medication: Yes  Had not been eating and exercising right   Last eye exam: one year  Last foot exam:   At home BS ranges: <130 was around 200   Lab Results   Component Value Date    HGBA1C 6.50 (H) 2022     Hyperlipidemia  Current medication: lipitor   Tolerating medication: Yes   Needs Refill: No, 90 days was just sent in     Lab Results   Component Value Date    CHOL 143 2022    CHLPL 164 2021    TRIG 166 (H) 2022    HDL 50 2022    LDL 65 2022       Hypertension:  Current medication: tenoretic and lisinopril   Tolerating Medication: Yes   Checking BP at home and it is: 120/85   Needs refills: No, 90 days was just sent in   Labs:  Lab Results   Component Value Date    GLUCOSE 103 (H) 2022    BUN 11 2022    CREATININE 0.95 2022    EGFRIFAFRI 99 2022    BCR 11.6 2022    K 4.1 2022    CO2 30.9 (H) 2022    CALCIUM 9.7 2022    ALBUMIN 4.70 2022    LABIL2 1.5 2021    AST 23 2022    ALT 24 2022       Past Medical History: changes since :          Hypertension         Surgical History:         lipoma;     Procedures:    Colonoscopy ( 3-17-16 normal [facility] )         Family History:     Father:  at age 40's; Cause of death was pancreatitis    ; Positive for Type 2 Diabetes;     Mother:    Positive for  Arrhythmia;     ; Positive for Lung Cancer;     Brother(s): 2 brother(s) total    ; Positive for Type 2 Diabetes;     Sister(s): 1 sister(s) total    ; Positive for thyroid disorder;     Daughter(s): Healthy; 1 daughter(s) total     Paternal Grandfather: Cause of death was cancer     Paternal Grandmother: Cause of death was cancer     Maternal Grandfather:      Maternal Grandmother:  Father:  at age 40's; Cause of death was pancreatitis     Mother: Lung Cancer     Son(s): 3 son(s) total; 1 ;  MVA     Daughter(s): Healthy; 1 daughter(s) total         Social History:     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   Eyes: Negative for visual disturbance.   Respiratory: Negative for cough and shortness of breath.    Cardiovascular: Negative for chest pain, palpitations and leg swelling.   Musculoskeletal:        No issues with his feet                Current Outpatient Medications:   •  atenolol-chlorthalidone (TENORETIC) 50-25 MG per tablet, TAKE 1/2 TABLET DAILY, Disp: 45 tablet, Rfl: 0  •  atorvastatin (LIPITOR) 10 MG tablet, TAKE 1 TABLET DAILY, Disp: 90 tablet, Rfl: 0  •  lisinopril (PRINIVIL,ZESTRIL) 10 MG tablet, TAKE 1 TABLET DAILY, Disp: 90 tablet, Rfl: 0  •  metFORMIN ER (GLUCOPHAGE-XR) 750 MG 24 hr tablet, TAKE 1 TABLET DAILY WITH   BREAKFAST, Disp: 90 tablet, Rfl: 0      PE:   Objective   There were no vitals taken for this visit.    There is no height or weight on file to calculate BMI.    Physical Exam  Constitutional:       General: He is not in acute distress.  Pulmonary:      Effort: No respiratory distress.   Neurological:      Mental Status: He is alert and oriented to person, place, and time.   Psychiatric:         Mood and Affect: Mood normal.         Behavior: Behavior normal.                               A/P:     Assessment & Plan   Diagnoses and all  orders for this visit:    1. Mixed hyperlipidemia (Primary)  Assessment & Plan:  He will come in Friday am fasting this week and he needs a Tdap (he is expecting a new grand child in ) Continue current medication and efforts with diet and exercise.         2. Essential (primary) hypertension  Assessment & Plan:  Needs his BP checked here, advised to get when he comes in fasting for labs later this week         3. Type 2 diabetes mellitus without complication, without long-term current use of insulin (HCC)  Assessment & Plan:  To continue to work on diet, regular exercise, monitor sugars, check feet daily, get his optometrist appt set up, he is past due      Orders:  -     Comprehensive Metabolic Panel; Future  -     Lipid Panel; Future  -     Hemoglobin A1c; Future  -     Microalbumin / Creatinine Urine Ratio - Urine, Clean Catch; Future           Follow up:    Return for fasting for labs.

## 2022-09-26 NOTE — ASSESSMENT & PLAN NOTE
He will come in Friday am fasting this week and he needs a Tdap (he is expecting a new grand child in ) Continue current medication and efforts with diet and exercise.

## 2022-09-30 ENCOUNTER — LAB (OUTPATIENT)
Dept: LAB | Facility: HOSPITAL | Age: 57
End: 2022-09-30

## 2022-09-30 ENCOUNTER — CLINICAL SUPPORT (OUTPATIENT)
Dept: FAMILY MEDICINE CLINIC | Age: 57
End: 2022-09-30

## 2022-09-30 VITALS — DIASTOLIC BLOOD PRESSURE: 80 MMHG | HEART RATE: 59 BPM | SYSTOLIC BLOOD PRESSURE: 133 MMHG

## 2022-09-30 DIAGNOSIS — Z23 IMMUNIZATION DUE: Primary | ICD-10-CM

## 2022-09-30 DIAGNOSIS — E11.9 TYPE 2 DIABETES MELLITUS WITHOUT COMPLICATION, WITHOUT LONG-TERM CURRENT USE OF INSULIN: ICD-10-CM

## 2022-09-30 LAB
ALBUMIN SERPL-MCNC: 4.3 G/DL (ref 3.5–5.2)
ALBUMIN UR-MCNC: <1.2 MG/DL
ALBUMIN/GLOB SERPL: 1.6 G/DL
ALP SERPL-CCNC: 77 U/L (ref 39–117)
ALT SERPL W P-5'-P-CCNC: 37 U/L (ref 1–41)
ANION GAP SERPL CALCULATED.3IONS-SCNC: 13.6 MMOL/L (ref 5–15)
AST SERPL-CCNC: 23 U/L (ref 1–40)
BILIRUB SERPL-MCNC: 0.9 MG/DL (ref 0–1.2)
BUN SERPL-MCNC: 12 MG/DL (ref 6–20)
BUN/CREAT SERPL: 12.2 (ref 7–25)
CALCIUM SPEC-SCNC: 9.6 MG/DL (ref 8.6–10.5)
CHLORIDE SERPL-SCNC: 97 MMOL/L (ref 98–107)
CHOLEST SERPL-MCNC: 129 MG/DL (ref 0–200)
CO2 SERPL-SCNC: 26.4 MMOL/L (ref 22–29)
CREAT SERPL-MCNC: 0.98 MG/DL (ref 0.76–1.27)
CREAT UR-MCNC: 177 MG/DL
EGFRCR SERPLBLD CKD-EPI 2021: 89.9 ML/MIN/1.73
GLOBULIN UR ELPH-MCNC: 2.7 GM/DL
GLUCOSE SERPL-MCNC: 185 MG/DL (ref 65–99)
HBA1C MFR BLD: 8.3 % (ref 4.8–5.6)
HDLC SERPL-MCNC: 38 MG/DL (ref 40–60)
LDLC SERPL CALC-MCNC: 70 MG/DL (ref 0–100)
LDLC/HDLC SERPL: 1.81 {RATIO}
MICROALBUMIN/CREAT UR: NORMAL MG/G{CREAT}
POTASSIUM SERPL-SCNC: 3.5 MMOL/L (ref 3.5–5.2)
PROT SERPL-MCNC: 7 G/DL (ref 6–8.5)
SODIUM SERPL-SCNC: 137 MMOL/L (ref 136–145)
TRIGL SERPL-MCNC: 112 MG/DL (ref 0–150)
VLDLC SERPL-MCNC: 21 MG/DL (ref 5–40)

## 2022-09-30 PROCEDURE — 82570 ASSAY OF URINE CREATININE: CPT

## 2022-09-30 PROCEDURE — 80061 LIPID PANEL: CPT

## 2022-09-30 PROCEDURE — 90715 TDAP VACCINE 7 YRS/> IM: CPT | Performed by: FAMILY MEDICINE

## 2022-09-30 PROCEDURE — 82043 UR ALBUMIN QUANTITATIVE: CPT

## 2022-09-30 PROCEDURE — 83036 HEMOGLOBIN GLYCOSYLATED A1C: CPT

## 2022-09-30 PROCEDURE — 36415 COLL VENOUS BLD VENIPUNCTURE: CPT

## 2022-09-30 PROCEDURE — 80053 COMPREHEN METABOLIC PANEL: CPT

## 2022-09-30 PROCEDURE — 90471 IMMUNIZATION ADMIN: CPT | Performed by: FAMILY MEDICINE

## 2022-10-02 ENCOUNTER — PATIENT MESSAGE (OUTPATIENT)
Dept: FAMILY MEDICINE CLINIC | Age: 57
End: 2022-10-02

## 2022-10-04 DIAGNOSIS — E11.9 TYPE 2 DIABETES MELLITUS WITHOUT COMPLICATION, WITHOUT LONG-TERM CURRENT USE OF INSULIN: Primary | ICD-10-CM

## 2022-10-04 RX ORDER — METFORMIN HYDROCHLORIDE 500 MG/1
500 TABLET, EXTENDED RELEASE ORAL
Qty: 90 TABLET | Refills: 0 | Status: SHIPPED | OUTPATIENT
Start: 2022-10-04 | End: 2022-12-14

## 2022-11-08 ENCOUNTER — TELEPHONE (OUTPATIENT)
Dept: FAMILY MEDICINE CLINIC | Age: 57
End: 2022-11-08

## 2022-11-08 NOTE — TELEPHONE ENCOUNTER
Caller: Jared Restrepo    Relationship: Self    Best call back number: [phone]    Requested Prescriptions:   Requested Prescriptions      No prescriptions requested or ordered in this encounter      DEXCOM GLUCOSE READER     Pharmacy where request should be sent: [facility] - [facility] - [facility] AT PORTAL TO REGISTERED [facility] - [phone]  - [phone]         Additional details provided by patient:      THE PATIENT SAID HE WOULD LIKE BE NOTIFIED ONCE THIS HAS BEEN SENT TO THE PHARMACY             Amy White Rep   11/08/22 16:12 EST

## 2022-11-09 RX ORDER — PROCHLORPERAZINE 25 MG/1
1 SUPPOSITORY RECTAL 2 TIMES DAILY
Qty: 1 EACH | Refills: 0 | Status: SHIPPED | OUTPATIENT
Start: 2022-11-09 | End: 2022-11-15

## 2022-11-09 NOTE — TELEPHONE ENCOUNTER
Rx Refill Note  Requested Prescriptions      No prescriptions requested or ordered in this encounter      Last office visit with prescribing clinician: 8/2/2021      Next office visit with prescribing clinician: 3/27/2023       Letitia Yanez MA  11/09/22, 11:09 EST     DEXCOM GLUCOSE READER     Anamaria Edwards APRN You; [facility] 27 minutes ago (10:40 AM)     AB  Okay to place an order for this meter

## 2022-11-15 ENCOUNTER — TELEPHONE (OUTPATIENT)
Dept: FAMILY MEDICINE CLINIC | Age: 57
End: 2022-11-15

## 2022-11-15 RX ORDER — PROCHLORPERAZINE 25 MG/1
1 SUPPOSITORY RECTAL 2 TIMES DAILY
Qty: 1 EACH | Refills: 1 | Status: SHIPPED | OUTPATIENT
Start: 2022-11-15 | End: 2022-11-22 | Stop reason: DRUGHIGH

## 2022-11-15 RX ORDER — PROCHLORPERAZINE 25 MG/1
SUPPOSITORY RECTAL
Qty: 3 EACH | Refills: 2 | Status: SHIPPED | OUTPATIENT
Start: 2022-11-15

## 2022-11-15 NOTE — TELEPHONE ENCOUNTER
[facility] - [address] [facility] AT PORTAL TO REGISTERED [facility] - [phone]  - [phone]      Pharmacy representative phone number: [phone]    What medication are you calling in regards to: DEXCOM , DEXCOM TRANSMITTER    Who is the provider that prescribed the medication: CHRISTELLE JOHNSON    Additional notes: PHARMACY NEEDS CLARIFICATION ON THE DIRECTIONS FOR BOTH PRESCRIPTIONS. THEY ARE PLACING THE PRESCRIPTIONS ON HOLD. DIRECTIONS CAN BE CLARIFIED THROUGH PHONE OR THROUGH FAX BY SENDING THE PAPERWORK BACK.    REFERENCE #: [id number]

## 2022-11-22 RX ORDER — PROCHLORPERAZINE 25 MG/1
1 SUPPOSITORY RECTAL CONTINUOUS
Qty: 1 EACH | Refills: 0 | Status: SHIPPED | OUTPATIENT
Start: 2022-11-22

## 2022-11-22 RX ORDER — PROCHLORPERAZINE 25 MG/1
1 SUPPOSITORY RECTAL CONTINUOUS
Qty: 1 EACH | Refills: 3 | Status: SHIPPED | OUTPATIENT
Start: 2022-11-22

## 2022-12-13 DIAGNOSIS — E78.2 MIXED HYPERLIPIDEMIA: ICD-10-CM

## 2022-12-13 DIAGNOSIS — I10 ESSENTIAL (PRIMARY) HYPERTENSION: ICD-10-CM

## 2022-12-13 DIAGNOSIS — E11.9 TYPE 2 DIABETES MELLITUS WITHOUT COMPLICATION, WITHOUT LONG-TERM CURRENT USE OF INSULIN: ICD-10-CM

## 2022-12-13 DIAGNOSIS — E11.9 TYPE 2 DIABETES MELLITUS WITHOUT COMPLICATION, WITHOUT LONG-TERM CURRENT USE OF INSULIN: Primary | ICD-10-CM

## 2022-12-14 RX ORDER — METFORMIN HYDROCHLORIDE 500 MG/1
TABLET, EXTENDED RELEASE ORAL
Qty: 90 TABLET | Refills: 0 | Status: SHIPPED | OUTPATIENT
Start: 2022-12-14 | End: 2023-03-08

## 2022-12-14 RX ORDER — ATORVASTATIN CALCIUM 10 MG/1
TABLET, FILM COATED ORAL
Qty: 90 TABLET | Refills: 0 | Status: SHIPPED | OUTPATIENT
Start: 2022-12-14 | End: 2023-03-07

## 2022-12-14 RX ORDER — LISINOPRIL 10 MG/1
TABLET ORAL
Qty: 90 TABLET | Refills: 0 | Status: SHIPPED | OUTPATIENT
Start: 2022-12-14 | End: 2023-03-08

## 2022-12-14 RX ORDER — METFORMIN HYDROCHLORIDE 750 MG/1
TABLET, EXTENDED RELEASE ORAL
Qty: 90 TABLET | Refills: 0 | Status: SHIPPED | OUTPATIENT
Start: 2022-12-14 | End: 2023-03-08

## 2022-12-14 NOTE — TELEPHONE ENCOUNTER
Rx Refill Note  Requested Prescriptions     Pending Prescriptions Disp Refills   • atenolol-chlorthalidone (TENORETIC) 50-25 MG per tablet [Pharmacy Med Name: ATENOL/CHLOR TAB 50-25MG] 45 tablet 0     Sig: TAKE 1/2 TABLET DAILY   • metFORMIN ER (GLUCOPHAGE-XR) 750 MG 24 hr tablet [Pharmacy Med Name: METFORMIN ER TAB 750MG GP] 90 tablet 0     Sig: TAKE 1 TABLET DAILY WITH   BREAKFAST   • atorvastatin (LIPITOR) 10 MG tablet [Pharmacy Med Name: ATORVASTATIN TAB 10MG] 90 tablet 0     Sig: TAKE 1 TABLET DAILY   • lisinopril (PRINIVIL,ZESTRIL) 10 MG tablet [Pharmacy Med Name: LISINOPRIL TAB 10MG] 90 tablet 0     Sig: TAKE 1 TABLET DAILY   • metFORMIN ER (GLUCOPHAGE-XR) 500 MG 24 hr tablet [Pharmacy Med Name: METFORMIN ER TAB 500MG GP] 90 tablet 0     Sig: TAKE 1 TABLET IN THE       EVENING WITH DINNER. TAKE  METFORMIN XR 750MG IN THE  MORNING      Last office visit with prescribing clinician: 8/2/2021   Last telemedicine visit with prescribing clinician: 3/27/2023   Next office visit with prescribing clinician: 3/27/2023  Beta-Blockers Protocol Failed         Marie Monroe LPN  12/14/22, 08:41 EST

## 2022-12-14 NOTE — TELEPHONE ENCOUNTER
Clarify this, I have had tele health visits, this is his Bp I(s much better, see how he is doing   /80 ) earlier this month, then send in his BP rx

## 2022-12-18 LAB — HBA1C MFR BLD: 6.6 % (ref 4.8–5.6)

## 2022-12-19 RX ORDER — ATENOLOL AND CHLORTHALIDONE TABLET 50; 25 MG/1; MG/1
TABLET ORAL
Qty: 45 TABLET | Refills: 0 | Status: SHIPPED | OUTPATIENT
Start: 2022-12-19 | End: 2023-03-08

## 2023-03-06 DIAGNOSIS — E78.2 MIXED HYPERLIPIDEMIA: ICD-10-CM

## 2023-03-07 DIAGNOSIS — E11.9 TYPE 2 DIABETES MELLITUS WITHOUT COMPLICATION, WITHOUT LONG-TERM CURRENT USE OF INSULIN: ICD-10-CM

## 2023-03-07 DIAGNOSIS — I10 ESSENTIAL (PRIMARY) HYPERTENSION: ICD-10-CM

## 2023-03-07 RX ORDER — ATORVASTATIN CALCIUM 10 MG/1
TABLET, FILM COATED ORAL
Qty: 90 TABLET | Refills: 0 | Status: SHIPPED | OUTPATIENT
Start: 2023-03-07

## 2023-03-08 RX ORDER — ATENOLOL AND CHLORTHALIDONE TABLET 50; 25 MG/1; MG/1
TABLET ORAL
Qty: 45 TABLET | Refills: 0 | Status: SHIPPED | OUTPATIENT
Start: 2023-03-08

## 2023-03-08 RX ORDER — LISINOPRIL 10 MG/1
TABLET ORAL
Qty: 90 TABLET | Refills: 0 | Status: SHIPPED | OUTPATIENT
Start: 2023-03-08

## 2023-03-08 RX ORDER — METFORMIN HYDROCHLORIDE 500 MG/1
TABLET, EXTENDED RELEASE ORAL
Qty: 90 TABLET | Refills: 0 | Status: SHIPPED | OUTPATIENT
Start: 2023-03-08 | End: 2023-03-21 | Stop reason: DRUGHIGH

## 2023-03-08 RX ORDER — METFORMIN HYDROCHLORIDE 750 MG/1
TABLET, EXTENDED RELEASE ORAL
Qty: 90 TABLET | Refills: 0 | Status: SHIPPED | OUTPATIENT
Start: 2023-03-08 | End: 2023-03-21 | Stop reason: DRUGHIGH

## 2023-03-08 NOTE — TELEPHONE ENCOUNTER
Rx Refill Note  Requested Prescriptions     Pending Prescriptions Disp Refills   • atenolol-chlorthalidone (TENORETIC) 50-25 MG per tablet [Pharmacy Med Name: ATENOL/CHLOR TAB 50-25MG] 45 tablet 0     Sig: TAKE 1/2 TABLET DAILY   • lisinopril (PRINIVIL,ZESTRIL) 10 MG tablet [Pharmacy Med Name: LISINOPRIL TAB 10MG] 90 tablet 0     Sig: TAKE 1 TABLET DAILY   • metFORMIN ER (GLUCOPHAGE-XR) 750 MG 24 hr tablet [Pharmacy Med Name: METFORMIN ER TAB 750MG GP] 90 tablet 0     Sig: TAKE 1 TABLET DAILY WITH   BREAKFAST   • metFORMIN ER (GLUCOPHAGE-XR) 500 MG 24 hr tablet [Pharmacy Med Name: METFORMIN ER TAB 500MG GP] 90 tablet 0     Sig: TAKE 1 TABLET IN THE       EVENING WITH DINNER. TAKE  METFORMIN XR 750MG IN THE  MORNING      Last office visit with prescribing clinician: 8/2/2021   Last telemedicine visit with prescribing clinician: 3/21/2023   Next office visit with prescribing clinician: 3/21/2023  Hemoglobin A1c (12/17/2022 09:42)  Comprehensive Metabolic Panel (09/30/2022 08:31)    Beta-Blockers Protocol Failed     Marie Monroe LPN  03/08/23, 13:43 EST

## 2023-03-21 ENCOUNTER — OFFICE VISIT (OUTPATIENT)
Dept: FAMILY MEDICINE CLINIC | Age: 58
End: 2023-03-21
Payer: COMMERCIAL

## 2023-03-21 ENCOUNTER — TELEPHONE (OUTPATIENT)
Dept: FAMILY MEDICINE CLINIC | Age: 58
End: 2023-03-21

## 2023-03-21 ENCOUNTER — LAB (OUTPATIENT)
Dept: LAB | Facility: HOSPITAL | Age: 58
End: 2023-03-21
Payer: COMMERCIAL

## 2023-03-21 VITALS
WEIGHT: 217.2 LBS | SYSTOLIC BLOOD PRESSURE: 142 MMHG | BODY MASS INDEX: 29.42 KG/M2 | TEMPERATURE: 98.4 F | HEART RATE: 76 BPM | DIASTOLIC BLOOD PRESSURE: 80 MMHG | HEIGHT: 72 IN | OXYGEN SATURATION: 95 %

## 2023-03-21 DIAGNOSIS — I10 ESSENTIAL (PRIMARY) HYPERTENSION: ICD-10-CM

## 2023-03-21 DIAGNOSIS — I10 ESSENTIAL (PRIMARY) HYPERTENSION: Primary | ICD-10-CM

## 2023-03-21 DIAGNOSIS — D17.9 LIPOMA, UNSPECIFIED SITE: ICD-10-CM

## 2023-03-21 DIAGNOSIS — R22.1 LOCALIZED SWELLING, MASS AND LUMP, NECK: ICD-10-CM

## 2023-03-21 DIAGNOSIS — E78.2 MIXED HYPERLIPIDEMIA: ICD-10-CM

## 2023-03-21 DIAGNOSIS — E11.9 TYPE 2 DIABETES MELLITUS WITHOUT COMPLICATION, WITHOUT LONG-TERM CURRENT USE OF INSULIN: ICD-10-CM

## 2023-03-21 LAB — HBA1C MFR BLD: 7 % (ref 4.8–5.6)

## 2023-03-21 PROCEDURE — 36415 COLL VENOUS BLD VENIPUNCTURE: CPT

## 2023-03-21 PROCEDURE — 83036 HEMOGLOBIN GLYCOSYLATED A1C: CPT

## 2023-03-21 PROCEDURE — 80061 LIPID PANEL: CPT

## 2023-03-21 PROCEDURE — 80053 COMPREHEN METABOLIC PANEL: CPT

## 2023-03-21 RX ORDER — METFORMIN HYDROCHLORIDE EXTENDED-RELEASE TABLETS 1000 MG/1
1000 TABLET, FILM COATED, EXTENDED RELEASE ORAL
Qty: 90 TABLET | Refills: 1 | Status: SHIPPED | OUTPATIENT
Start: 2023-03-21 | End: 2023-03-30 | Stop reason: CLARIF

## 2023-03-21 NOTE — ASSESSMENT & PLAN NOTE
Reviewed EMD chart, did not find a note from Dr Morgan or surgeon re a lipoma / send to surgeon for a consult

## 2023-03-21 NOTE — ASSESSMENT & PLAN NOTE
Will switch to the metfromin XR 1000 mg once a day, Wants to see if insurance will cover new dexcom, [facility]   To work on diet, regular exercise, continue to monitor sugars, check feet daily, see optometrist yearly or as directed.

## 2023-03-21 NOTE — TELEPHONE ENCOUNTER
----- Message from CHRISTELLE Mar sent at 3/21/2023 10:58 AM EDT -----  There is a new smaller dexcom, he really likes his dexcom, wants to see if he can get the newer one, to [facility], can you send order

## 2023-03-21 NOTE — PROGRESS NOTES
Jared Restrepo presents to [facility].    Chief Complaint:  Annual Exam (6 months)         History of Present Illness:  Diabetes:  Current medication: metformin XR taking total of 1000 mg in am   Tolerating medication: Yes  Last eye exam: fall of 2022  Last foot exam: 2-2021  At home BS ranges: 130 (has dexcom) would like the new one   Lab Results       Component                Value               Date                       HGBA1C                   6.6 (H)             12/17/2022              Hyperlipidemia  Current medication: lipitor  Tolerating medication: Yes  Needs Refill: Yes to [facility]    Lab Results       Component                Value               Date                       CHOL                     129                 09/30/2022                 CHLPL                    164                 01/07/2021                 TRIG                     112                 09/30/2022                 HDL                      38 (L)              09/30/2022                 LDL                      70                  09/30/2022              Hypertension:  Current medication: tenoretic and Lisinopril   Tolerating Medication: Yes  Checking BP at home and it is: 130/80's   Needs refills: Yes [facility]   Labs:  Lab Results       Component                Value               Date                       GLUCOSE                  185 (H)             09/30/2022                 BUN                      12                  09/30/2022                 CREATININE               0.98                09/30/2022                 EGFRIFAFRI               99                  01/17/2022                 BCR                      12.2                09/30/2022                 K                        3.5                 09/30/2022                 CO2                      26.4                09/30/2022                 CALCIUM                  9.6                 09/30/2022                 ALBUMIN                  4.30                 2022                 LABIL2                   1.5                 2021                 AST                      23                  2022                 ALT                      37                  2022              Past Medical History: changes since :          Hypertension         Surgical History:         lipoma;     Procedures:    Colonoscopy ( 3-17-16 normal [facility] )         Family History:     Father:  at age 40's; Cause of death was pancreatitis    ; Positive for Type 2 Diabetes;     Mother:    Positive for Arrhythmia;     ; Positive for Lung Cancer;     Brother(s): 2 brother(s) total    ; Positive for Type 2 Diabetes;     Sister(s): 1 sister(s) total    ; Positive for thyroid disorder;     Daughter(s): Healthy; 1 daughter(s) total     Paternal Grandfather: Cause of death was cancer     Paternal Grandmother: Cause of death was cancer     Maternal Grandfather:      Maternal Grandmother:  Father:  at age 40's; Cause of death was pancreatitis     Mother: Lung Cancer     Son(s): 3 son(s) total; 1 ;  MVA     Daughter(s): Healthy; 1 daughter(s) total         Social History:     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   Respiratory: Negative for cough and shortness of breath.    Cardiovascular: Negative for chest pain, palpitations and leg swelling.   Skin:        Swelling in neck, had lipoma removed 8 years ago apprx he thinks jones, reoccurring    Neurological: Negative for numbness.          Current Outpatient Medications:   •  atenolol-chlorthalidone (TENORETIC) 50-25 MG per tablet, TAKE 1/2 TABLET DAILY, Disp: 45 tablet, Rfl: 0  •  atorvastatin (LIPITOR) 10 MG tablet, TAKE 1 TABLET DAILY, Disp: 90 tablet, Rfl: 0  •  Continuous Blood Gluc  (Dexcom G6 ) device, 1 each  "Continuous., Disp: 1 each, Rfl: 0  •  Continuous Blood Gluc Sensor (Dexcom G6 Sensor), Every 10 (Ten) Days., Disp: 3 each, Rfl: 2  •  Continuous Blood Gluc Transmit (Dexcom G6 Transmitter) misc, 1 each Continuous., Disp: 1 each, Rfl: 3  •  lisinopril (PRINIVIL,ZESTRIL) 10 MG tablet, TAKE 1 TABLET DAILY, Disp: 90 tablet, Rfl: 0  •  metFORMIN (FORTAMET) 1000 MG (OSM) 24 hr tablet, Take 1 tablet by mouth Daily With Breakfast., Disp: 90 tablet, Rfl: 01    Vital Signs:   Vitals:    03/21/23 1032   BP: 142/80   BP Location: Left arm   Patient Position: Sitting   Pulse: 76   Temp: 98.4 °F (36.9 °C)   TempSrc: Oral   SpO2: 95%  Comment: on room air   Weight: 98.5 kg (217 lb 3.2 oz)   Height: 182.9 cm (72\")         Physical Exam:  Physical Exam  Constitutional:       Appearance: Normal appearance.   Neck:      Vascular: No carotid bruit.   Cardiovascular:      Rate and Rhythm: Normal rate and regular rhythm.      Pulses:           Posterior tibial pulses are 2+ on the right side and 2+ on the left side.      Heart sounds: No murmur heard.  Pulmonary:      Effort: No respiratory distress.      Breath sounds: Normal breath sounds.   Musculoskeletal:         General: No swelling.   Feet:      Right foot:      Protective Sensation: 3 sites tested. 3 sites sensed.      Skin integrity: Skin integrity normal. No ulcer or blister.      Toenail Condition: Right toenails are normal.      Left foot:      Protective Sensation: 3 sites tested. 3 sites sensed.      Skin integrity: Skin integrity normal. No ulcer or blister.      Toenail Condition: Left toenails are normal.      Comments: Diabetic Foot Exam Performed and Monofilament Test Performed     Skin:     Comments: Soft swelling in right posterior upper neck, no erythema    Neurological:      Mental Status: He is alert.   Psychiatric:         Mood and Affect: Mood normal.         Thought Content: Thought content normal.         Result Review      The following data was reviewed by: " Anamaria Edwards, APRN on 03/21/2023:    Results for orders placed or performed in visit on 12/17/22   Hemoglobin A1c   Result Value Ref Range    Hemoglobin A1C 6.6 (H) 4.8 - 5.6 %               Assessment and Plan:          Diagnoses and all orders for this visit:    1. Essential (primary) hypertension (Primary)  Assessment & Plan:  Hypertension is stable. Continue to monitor BP at home. Continue current meds. Continue to modify diet and lifestyle.     Orders:  -     Comprehensive Metabolic Panel; Future    2. Mixed hyperlipidemia  Assessment & Plan:  Continue current medication and efforts with diet and exercise.       Orders:  -     Lipid Panel; Future    3. Type 2 diabetes mellitus without complication, without long-term current use of insulin (HCC)  Assessment & Plan:  Will switch to the metfromin XR 1000 mg once a day, Wants to see if insurance will cover new dexcom, [facility]   To work on diet, regular exercise, continue to monitor sugars, check feet daily, see optometrist yearly or as directed.      Orders:  -     Comprehensive Metabolic Panel; Future  -     Lipid Panel; Future  -     Hemoglobin A1c; Future  -     metFORMIN (FORTAMET) 1000 MG (OSM) 24 hr tablet; Take 1 tablet by mouth Daily With Breakfast.  Dispense: 90 tablet; Refill: 01    4. Localized swelling, mass and lump, neck  Assessment & Plan:  Send to surgeon for a consult to evaluate for lipoma     Orders:  -     Ambulatory Referral to General Surgery    5. Lipoma, unspecified site  Assessment & Plan:  Reviewed EMD chart, did not find a note from Dr Morgan or surgeon re a lipoma / send to surgeon for a consult           Follow Up   Return for followup pending lab results.  Patient was given instructions and counseling regarding his condition or for health maintenance advice. Please see specific information pulled into the AVS if appropriate.

## 2023-03-21 NOTE — TELEPHONE ENCOUNTER
[facility], [facility] said the dexcom g7 is not covered on his formulary.   Pt informed.

## 2023-03-22 LAB
ALBUMIN SERPL-MCNC: 4.7 G/DL (ref 3.5–5.2)
ALBUMIN/GLOB SERPL: 1.7 G/DL
ALP SERPL-CCNC: 92 U/L (ref 39–117)
ALT SERPL W P-5'-P-CCNC: 22 U/L (ref 1–41)
ANION GAP SERPL CALCULATED.3IONS-SCNC: 13.5 MMOL/L (ref 5–15)
AST SERPL-CCNC: 21 U/L (ref 1–40)
BILIRUB SERPL-MCNC: 1 MG/DL (ref 0–1.2)
BUN SERPL-MCNC: 16 MG/DL (ref 6–20)
BUN/CREAT SERPL: 15.8 (ref 7–25)
CALCIUM SPEC-SCNC: 8.9 MG/DL (ref 8.6–10.5)
CHLORIDE SERPL-SCNC: 97 MMOL/L (ref 98–107)
CHOLEST SERPL-MCNC: 115 MG/DL (ref 0–200)
CO2 SERPL-SCNC: 26.5 MMOL/L (ref 22–29)
CREAT SERPL-MCNC: 1.01 MG/DL (ref 0.76–1.27)
EGFRCR SERPLBLD CKD-EPI 2021: 86.7 ML/MIN/1.73
GLOBULIN UR ELPH-MCNC: 2.8 GM/DL
GLUCOSE SERPL-MCNC: 132 MG/DL (ref 65–99)
HDLC SERPL-MCNC: 42 MG/DL (ref 40–60)
LDLC SERPL CALC-MCNC: 57 MG/DL (ref 0–100)
LDLC/HDLC SERPL: 1.35 {RATIO}
POTASSIUM SERPL-SCNC: 3.3 MMOL/L (ref 3.5–5.2)
PROT SERPL-MCNC: 7.5 G/DL (ref 6–8.5)
SODIUM SERPL-SCNC: 137 MMOL/L (ref 136–145)
TRIGL SERPL-MCNC: 81 MG/DL (ref 0–150)
VLDLC SERPL-MCNC: 16 MG/DL (ref 5–40)

## 2023-03-23 ENCOUNTER — TELEPHONE (OUTPATIENT)
Dept: FAMILY MEDICINE CLINIC | Age: 58
End: 2023-03-23
Payer: COMMERCIAL

## 2023-03-23 DIAGNOSIS — E56.1 LOW SERUM VITAMIN K: Primary | ICD-10-CM

## 2023-03-23 NOTE — TELEPHONE ENCOUNTER
[facility] states Metformin ER tab 1000mgis not covered under insurance without a PA    Will cover metformin tabs and metformin ER tab (Gen Glucophage).    Please advise and thank you

## 2023-03-24 NOTE — TELEPHONE ENCOUNTER
Please advise why pt must take Metformin (Fortamet) osmotic tabs, and not Metformin tabs or Metformin ER (Glucophage).

## 2023-03-26 NOTE — TELEPHONE ENCOUNTER
He needs to be on Metformin XR and he needs to take a total of 2000 mg per day, call him and see how he wants to get that rx, it can be taken all at one time, it is XR, talk to me re this

## 2023-03-29 NOTE — TELEPHONE ENCOUNTER
Let's get this correct, talk to pt, he needs to be on metfromin XR and take a total of 2000 mg daily, see how he wants to take and what his insurance will cover and then get it sent to his pharmacy correctly.  Come talk to me about this if needed.

## 2023-03-31 ENCOUNTER — LAB (OUTPATIENT)
Dept: LAB | Facility: HOSPITAL | Age: 58
End: 2023-03-31
Payer: COMMERCIAL

## 2023-03-31 DIAGNOSIS — E87.6 LOW BLOOD POTASSIUM: ICD-10-CM

## 2023-03-31 DIAGNOSIS — E87.6 LOW BLOOD POTASSIUM: Primary | ICD-10-CM

## 2023-03-31 LAB
ANION GAP SERPL CALCULATED.3IONS-SCNC: 10 MMOL/L (ref 5–15)
BUN SERPL-MCNC: 16 MG/DL (ref 6–20)
BUN/CREAT SERPL: 17.6 (ref 7–25)
CALCIUM SPEC-SCNC: 9.3 MG/DL (ref 8.6–10.5)
CHLORIDE SERPL-SCNC: 101 MMOL/L (ref 98–107)
CO2 SERPL-SCNC: 27 MMOL/L (ref 22–29)
CREAT SERPL-MCNC: 0.91 MG/DL (ref 0.76–1.27)
EGFRCR SERPLBLD CKD-EPI 2021: 97.7 ML/MIN/1.73
GLUCOSE SERPL-MCNC: 95 MG/DL (ref 65–99)
POTASSIUM SERPL-SCNC: 3.8 MMOL/L (ref 3.5–5.2)
SODIUM SERPL-SCNC: 138 MMOL/L (ref 136–145)

## 2023-03-31 PROCEDURE — 80048 BASIC METABOLIC PNL TOTAL CA: CPT

## 2023-03-31 PROCEDURE — 36415 COLL VENOUS BLD VENIPUNCTURE: CPT

## 2023-04-20 ENCOUNTER — OFFICE VISIT (OUTPATIENT)
Dept: SURGERY | Facility: CLINIC | Age: 58
End: 2023-04-20
Payer: COMMERCIAL

## 2023-04-20 VITALS — WEIGHT: 228 LBS | HEIGHT: 72 IN | BODY MASS INDEX: 30.88 KG/M2

## 2023-04-20 DIAGNOSIS — D17.0 LIPOMA OF NECK: Primary | ICD-10-CM

## 2023-04-20 PROCEDURE — 99202 OFFICE O/P NEW SF 15 MIN: CPT | Performed by: PHYSICIAN ASSISTANT

## 2023-04-20 NOTE — H&P (VIEW-ONLY)
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  Dr. House and I had a lengthy discussion about excising this under monitored anesthesia using a larger incision.  Risks and rationale were discussed with him with the risk including but not limited to bleeding, infection and recurrence rates.  He was understanding willing to proceed with all recommendations.  All questions were answered at the time of this visit.    CC:     Recurrent lipoma    HPI:    This is a 58-year-old gentleman presenting to the office today at the request of CHRISTELLE Doan for consultation.  In approximately 2018 he underwent excision of a lipoma on the right posterior aspect of the base of the skull and neck.  Over the last year or so he states he has noticed this area starting to become more full and soft, very similar to his previous lipoma.  This has continued to enlarge and has recently started to cause him some discomfort.  He denies the area becoming extremely tender, erythematous or draining.    ENDOSCOPY:   • Colonoscopy 2021 normal    SOCIAL HISTORY:   • Denies tobacco use  • Occasional alcohol use    FAMILY HISTORY:    • Colorectal cancer: None    OTHER SURGERY  Past Surgical History:   Procedure Laterality Date   • COLONOSCOPY  03/17/2016    Normal; [facility]   • COLONOSCOPY  2021    @ [facility]       PAST MEDICAL HISTORY:    Past Medical History:   Diagnosis Date   • Essential (primary) hypertension    • Hypercholesterolemia    • Lipoma    • Mixed hyperlipidemia    • Type 2 diabetes mellitus without complication        MEDICATIONS:     Current Outpatient Medications:   •  atenolol-chlorthalidone (TENORETIC) 50-25 MG per tablet, TAKE 1/2 TABLET DAILY, Disp: 45 tablet, Rfl: 0  •  atorvastatin (LIPITOR) 10 MG tablet, TAKE 1 TABLET DAILY, Disp: 90 tablet, Rfl: 0  •  Continuous Blood Gluc  (Dexcom G6 ) device, 1 each  "Continuous., Disp: 1 each, Rfl: 0  •  Continuous Blood Gluc Sensor (Dexcom G6 Sensor), Every 10 (Ten) Days., Disp: 3 each, Rfl: 2  •  Continuous Blood Gluc Transmit (Dexcom G6 Transmitter) misc, 1 each Continuous., Disp: 1 each, Rfl: 3  •  lisinopril (PRINIVIL,ZESTRIL) 10 MG tablet, TAKE 1 TABLET DAILY, Disp: 90 tablet, Rfl: 0  •  metFORMIN (GLUCOPHAGE) 1000 MG tablet, Take 1 tablet by mouth 2 (Two) Times a Day With Meals., Disp: 180 tablet, Rfl: 0    ALLERGIES:   Allergies   Allergen Reactions   • Penicillins Hives       PHYSICAL EXAM:   • Constitutional: Well-developed well-nourished, no acute distress  • Vital signs:   o Height 72\"  o Weight 228  o BMI 30.9  • Neck: Right posterior neck/base of skull lipoma measuring greater than 4 cm, well-healed incision from previous surgery, supple, no palpable mass elsewhere, trachea midline  • Psychiatric: Alert and oriented ×3, normal affect       Elton Caldwell PA-C    "

## 2023-05-05 ENCOUNTER — PRE-ADMISSION TESTING (OUTPATIENT)
Dept: PREADMISSION TESTING | Facility: HOSPITAL | Age: 58
End: 2023-05-05
Payer: COMMERCIAL

## 2023-05-05 VITALS
TEMPERATURE: 96.8 F | WEIGHT: 216.3 LBS | BODY MASS INDEX: 29.3 KG/M2 | RESPIRATION RATE: 16 BRPM | DIASTOLIC BLOOD PRESSURE: 77 MMHG | SYSTOLIC BLOOD PRESSURE: 139 MMHG | HEIGHT: 72 IN | HEART RATE: 60 BPM | OXYGEN SATURATION: 97 %

## 2023-05-05 LAB
ANION GAP SERPL CALCULATED.3IONS-SCNC: 12.8 MMOL/L (ref 5–15)
BUN SERPL-MCNC: 17 MG/DL (ref 6–20)
BUN/CREAT SERPL: 17.3 (ref 7–25)
CALCIUM SPEC-SCNC: 9.2 MG/DL (ref 8.6–10.5)
CHLORIDE SERPL-SCNC: 97 MMOL/L (ref 98–107)
CO2 SERPL-SCNC: 26.2 MMOL/L (ref 22–29)
CREAT SERPL-MCNC: 0.98 MG/DL (ref 0.76–1.27)
DEPRECATED RDW RBC AUTO: 39.1 FL (ref 37–54)
EGFRCR SERPLBLD CKD-EPI 2021: 89.4 ML/MIN/1.73
ERYTHROCYTE [DISTWIDTH] IN BLOOD BY AUTOMATED COUNT: 12.8 % (ref 12.3–15.4)
GLUCOSE SERPL-MCNC: 147 MG/DL (ref 65–99)
HCT VFR BLD AUTO: 39.7 % (ref 37.5–51)
HGB BLD-MCNC: 14.2 G/DL (ref 13–17.7)
MCH RBC QN AUTO: 30.3 PG (ref 26.6–33)
MCHC RBC AUTO-ENTMCNC: 35.8 G/DL (ref 31.5–35.7)
MCV RBC AUTO: 84.6 FL (ref 79–97)
PLATELET # BLD AUTO: 189 10*3/MM3 (ref 140–450)
PMV BLD AUTO: 10 FL (ref 6–12)
POTASSIUM SERPL-SCNC: 3.4 MMOL/L (ref 3.5–5.2)
RBC # BLD AUTO: 4.69 10*6/MM3 (ref 4.14–5.8)
SODIUM SERPL-SCNC: 136 MMOL/L (ref 136–145)
WBC NRBC COR # BLD: 4.51 10*3/MM3 (ref 3.4–10.8)

## 2023-05-05 PROCEDURE — 36415 COLL VENOUS BLD VENIPUNCTURE: CPT

## 2023-05-05 PROCEDURE — 85027 COMPLETE CBC AUTOMATED: CPT

## 2023-05-05 PROCEDURE — 80048 BASIC METABOLIC PNL TOTAL CA: CPT

## 2023-05-05 NOTE — DISCHARGE INSTRUCTIONS
Take the following medications the morning of surgery: ATENOLOL/CHLORTHALIDONE      If you are on prescription narcotic pain medication to control your pain you may also take that medication the morning of surgery.    General Instructions:  Do not eat solid food after midnight the night before surgery.  You may drink clear liquids day of surgery but must stop at least one hour before your hospital arrival time.  It is beneficial for you to have a clear drink that contains carbohydrates the day of surgery.  We suggest a 12 to 20 ounce bottle of Gatorade or Powerade for non-diabetic patients or a 12 to 20 ounce bottle of G2 or Powerade Zero for diabetic patients.     Clear liquids are liquids you can see through.  Nothing red in color.     Plain water                               Sports drinks  Sodas                                   Gelatin (Jell-O)  Fruit juices without pulp such as white grape juice and apple juice  Popsicles that contain no fruit or yogurt  Tea or coffee (no cream or milk added)  Gatorade / Powerade  G2 / Powerade Zero      Bring any papers given to you in the doctor’s office.  Wear clean comfortable clothes.  Do not wear contact lenses, false eyelashes or make-up.  Bring a case for your glasses.   Remove all piercings.  Leave jewelry and any other valuables at home.  The Pre-Admission Testing nurse will instruct you to bring medications if unable to obtain an accurate list in Pre-Admission Testing.            Preventing a Surgical Site Infection:  For 2 to 3 days before surgery, avoid shaving with a razor because the razor can irritate skin and make it easier to develop an infection.    Any areas of open skin can increase the risk of a post-operative wound infection by allowing bacteria to enter and travel throughout the body.  Notify your surgeon if you have any skin wounds / rashes even if it is not near the expected surgical site.  The area will need assessed to determine if surgery should be  delayed until it is healed.  The night prior to surgery shower using a fresh bar of anti-bacterial soap (such as Dial) and clean washcloth.  Sleep in a clean bed with clean clothing.  Do not allow pets to sleep with you.  Shower on the morning of surgery using a fresh bar of anti-bacterial soap (such as Dial) and clean washcloth.  Dry with a clean towel and dress in clean clothing.  Ask your surgeon if you will be receiving antibiotics prior to surgery.  Make sure you, your family, and all healthcare providers clean their hands with soap and water or an alcohol based hand  before caring for you or your wound.    Day of surgery:  Your arrival time is approximately two hours before your scheduled surgery time.  Upon arrival, a Pre-op nurse and Anesthesiologist will review your health history, obtain vital signs, and answer questions you may have.  The only belongings needed at this time will be a list of your home medications and if applicable your C-PAP/BI-PAP machine.  A Pre-op nurse will start an IV and you may receive medication in preparation for surgery, including something to help you relax.     Please be aware that surgery does come with discomfort.  We want to make every effort to control your discomfort so please discuss any uncontrolled symptoms with your nurse.   Your doctor will most likely have prescribed pain medications.      If you are going home after surgery you will receive individualized written care instructions before being discharged.  A responsible adult must drive you to and from the hospital on the day of your surgery and stay with you for 24 hours.  Discharge prescriptions can be filled by the hospital pharmacy during regular pharmacy hours.  If you are having surgery late in the day/evening your prescription may be e-prescribed to your pharmacy.  Please verify your pharmacy hours or chose a 24 hour pharmacy to avoid not having access to your prescription because your pharmacy has  closed for the day.    If you are staying overnight following surgery, you will be transported to your hospital room following the recovery period.  [facility] has all private rooms.    If you have any questions please call Pre-Admission Testing at [phone].  Deductibles and co-payments are collected on the day of service. Please be prepared to pay the required co-pay, deductible or deposit on the day of service as defined by your plan.    CHLORHEXIDINE CLOTH INSTRUCTIONS  The morning of surgery follow these instructions using the Chlorhexidine cloths you've been given.  These steps reduce bacteria on the body.  Do not use the cloths near your eyes, ears mouth, genitalia or on open wounds.  Throw the cloths away after use but do not try to flush them down a toilet.      Open and remove one cloth at a time from the package.    Leave the cloth unfolded and begin the bathing.  Massage the skin with the cloths using gentle pressure to remove bacteria.  Do not scrub harshly.   Follow the steps below with one 2% CHG cloth per area (6 total cloths).  One cloth for neck, shoulders and chest.  One cloth for both arms, hands, fingers and underarms (do underarms last).  One cloth for the abdomen followed by groin.  One cloth for right leg and foot including between the toes.  One cloth for left leg and foot including between the toes.  The last cloth is to be used for the back of the neck, back and buttocks.    Allow the CHG to air dry 3 minutes on the skin which will give it time to work and decrease the chance of irritation.  The skin may feel sticky until it is dry.  Do not rinse with water or any other liquid or you will lose the beneficial effects of the CHG.  If mild skin irritation occurs, do rinse the skin to remove the CHG.  Report this to the nurse at time of admission.  Do not apply lotions, creams, ointments, deodorants or perfumes after using the clothes. Dress in clean clothes before coming to  the hospital.     Call your surgeon immediately if you experience any of the following symptoms:  Sore Throat  Shortness of Breath or difficulty breathing  Cough  Chills  Body soreness or muscle pain  Headache  Fever  New loss of taste or smell  Do not arrive for your surgery ill.  Your procedure will need to be rescheduled to another time.  You will need to call your physician before the day of surgery to avoid any unnecessary exposure to hospital staff as well as other patients.

## 2023-05-12 ENCOUNTER — ANESTHESIA EVENT (OUTPATIENT)
Dept: PERIOP | Facility: HOSPITAL | Age: 58
End: 2023-05-12
Payer: COMMERCIAL

## 2023-05-12 ENCOUNTER — ANESTHESIA (OUTPATIENT)
Dept: PERIOP | Facility: HOSPITAL | Age: 58
End: 2023-05-12
Payer: COMMERCIAL

## 2023-05-12 ENCOUNTER — HOSPITAL ENCOUNTER (OUTPATIENT)
Facility: HOSPITAL | Age: 58
Setting detail: HOSPITAL OUTPATIENT SURGERY
Discharge: HOME | End: 2023-05-12
Attending: SURGERY | Admitting: SURGERY
Payer: COMMERCIAL

## 2023-05-12 VITALS
OXYGEN SATURATION: 100 % | DIASTOLIC BLOOD PRESSURE: 91 MMHG | HEART RATE: 69 BPM | SYSTOLIC BLOOD PRESSURE: 136 MMHG | RESPIRATION RATE: 16 BRPM | TEMPERATURE: 97.6 F

## 2023-05-12 DIAGNOSIS — D17.0 LIPOMA OF NECK: ICD-10-CM

## 2023-05-12 LAB — GLUCOSE BLDC GLUCOMTR-MCNC: 185 MG/DL (ref 70–130)

## 2023-05-12 PROCEDURE — 25010000002 PROPOFOL 10 MG/ML EMULSION: Performed by: NURSE ANESTHETIST, CERTIFIED REGISTERED

## 2023-05-12 PROCEDURE — 21552 EXC NECK LES SC 3 CM/>: CPT | Performed by: SURGERY

## 2023-05-12 PROCEDURE — 82948 REAGENT STRIP/BLOOD GLUCOSE: CPT

## 2023-05-12 PROCEDURE — 88304 TISSUE EXAM BY PATHOLOGIST: CPT | Performed by: SURGERY

## 2023-05-12 RX ORDER — HYDROCODONE BITARTRATE AND ACETAMINOPHEN 7.5; 325 MG/1; MG/1
1 TABLET ORAL ONCE AS NEEDED
Status: DISCONTINUED | OUTPATIENT
Start: 2023-05-12 | End: 2023-05-12 | Stop reason: HOSPADM

## 2023-05-12 RX ORDER — PROPOFOL 10 MG/ML
VIAL (ML) INTRAVENOUS AS NEEDED
Status: DISCONTINUED | OUTPATIENT
Start: 2023-05-12 | End: 2023-05-12 | Stop reason: SURG

## 2023-05-12 RX ORDER — FAMOTIDINE 10 MG/ML
20 INJECTION, SOLUTION INTRAVENOUS ONCE
Status: COMPLETED | OUTPATIENT
Start: 2023-05-12 | End: 2023-05-12

## 2023-05-12 RX ORDER — NALOXONE HCL 0.4 MG/ML
0.2 VIAL (ML) INJECTION AS NEEDED
Status: DISCONTINUED | OUTPATIENT
Start: 2023-05-12 | End: 2023-05-12 | Stop reason: HOSPADM

## 2023-05-12 RX ORDER — FENTANYL CITRATE 50 UG/ML
50 INJECTION, SOLUTION INTRAMUSCULAR; INTRAVENOUS
Status: DISCONTINUED | OUTPATIENT
Start: 2023-05-12 | End: 2023-05-12 | Stop reason: HOSPADM

## 2023-05-12 RX ORDER — DROPERIDOL 2.5 MG/ML
0.62 INJECTION, SOLUTION INTRAMUSCULAR; INTRAVENOUS
Status: DISCONTINUED | OUTPATIENT
Start: 2023-05-12 | End: 2023-05-12 | Stop reason: HOSPADM

## 2023-05-12 RX ORDER — DIPHENHYDRAMINE HYDROCHLORIDE 50 MG/ML
12.5 INJECTION INTRAMUSCULAR; INTRAVENOUS
Status: DISCONTINUED | OUTPATIENT
Start: 2023-05-12 | End: 2023-05-12 | Stop reason: HOSPADM

## 2023-05-12 RX ORDER — EPHEDRINE SULFATE 50 MG/ML
5 INJECTION, SOLUTION INTRAVENOUS ONCE AS NEEDED
Status: DISCONTINUED | OUTPATIENT
Start: 2023-05-12 | End: 2023-05-12 | Stop reason: HOSPADM

## 2023-05-12 RX ORDER — ONDANSETRON 2 MG/ML
4 INJECTION INTRAMUSCULAR; INTRAVENOUS ONCE AS NEEDED
Status: DISCONTINUED | OUTPATIENT
Start: 2023-05-12 | End: 2023-05-12 | Stop reason: HOSPADM

## 2023-05-12 RX ORDER — HYDRALAZINE HYDROCHLORIDE 20 MG/ML
5 INJECTION INTRAMUSCULAR; INTRAVENOUS
Status: DISCONTINUED | OUTPATIENT
Start: 2023-05-12 | End: 2023-05-12 | Stop reason: HOSPADM

## 2023-05-12 RX ORDER — ONDANSETRON 4 MG/1
4 TABLET, FILM COATED ORAL EVERY 6 HOURS PRN
Qty: 10 TABLET | Refills: 0 | Status: SHIPPED | OUTPATIENT
Start: 2023-05-12 | End: 2023-05-18

## 2023-05-12 RX ORDER — HYDROMORPHONE HYDROCHLORIDE 1 MG/ML
0.5 INJECTION, SOLUTION INTRAMUSCULAR; INTRAVENOUS; SUBCUTANEOUS
Status: DISCONTINUED | OUTPATIENT
Start: 2023-05-12 | End: 2023-05-12 | Stop reason: HOSPADM

## 2023-05-12 RX ORDER — IPRATROPIUM BROMIDE AND ALBUTEROL SULFATE 2.5; .5 MG/3ML; MG/3ML
3 SOLUTION RESPIRATORY (INHALATION) ONCE AS NEEDED
Status: DISCONTINUED | OUTPATIENT
Start: 2023-05-12 | End: 2023-05-12 | Stop reason: HOSPADM

## 2023-05-12 RX ORDER — MIDAZOLAM HYDROCHLORIDE 1 MG/ML
1 INJECTION INTRAMUSCULAR; INTRAVENOUS
Status: DISCONTINUED | OUTPATIENT
Start: 2023-05-12 | End: 2023-05-12 | Stop reason: HOSPADM

## 2023-05-12 RX ORDER — HYDROCODONE BITARTRATE AND ACETAMINOPHEN 5; 325 MG/1; MG/1
1 TABLET ORAL EVERY 4 HOURS PRN
Qty: 10 TABLET | Refills: 0 | Status: SHIPPED | OUTPATIENT
Start: 2023-05-12 | End: 2023-05-18

## 2023-05-12 RX ORDER — SODIUM CHLORIDE 0.9 % (FLUSH) 0.9 %
3-10 SYRINGE (ML) INJECTION AS NEEDED
Status: DISCONTINUED | OUTPATIENT
Start: 2023-05-12 | End: 2023-05-12 | Stop reason: HOSPADM

## 2023-05-12 RX ORDER — FLUMAZENIL 0.1 MG/ML
0.2 INJECTION INTRAVENOUS AS NEEDED
Status: DISCONTINUED | OUTPATIENT
Start: 2023-05-12 | End: 2023-05-12 | Stop reason: HOSPADM

## 2023-05-12 RX ORDER — PROMETHAZINE HYDROCHLORIDE 25 MG/1
25 TABLET ORAL ONCE AS NEEDED
Status: DISCONTINUED | OUTPATIENT
Start: 2023-05-12 | End: 2023-05-12 | Stop reason: HOSPADM

## 2023-05-12 RX ORDER — SODIUM CHLORIDE, SODIUM LACTATE, POTASSIUM CHLORIDE, CALCIUM CHLORIDE 600; 310; 30; 20 MG/100ML; MG/100ML; MG/100ML; MG/100ML
INJECTION, SOLUTION INTRAVENOUS CONTINUOUS PRN
Status: DISCONTINUED | OUTPATIENT
Start: 2023-05-12 | End: 2023-05-12 | Stop reason: SURG

## 2023-05-12 RX ORDER — LABETALOL HYDROCHLORIDE 5 MG/ML
5 INJECTION, SOLUTION INTRAVENOUS
Status: DISCONTINUED | OUTPATIENT
Start: 2023-05-12 | End: 2023-05-12 | Stop reason: HOSPADM

## 2023-05-12 RX ORDER — LIDOCAINE HYDROCHLORIDE 10 MG/ML
0.5 INJECTION, SOLUTION EPIDURAL; INFILTRATION; INTRACAUDAL; PERINEURAL ONCE AS NEEDED
Status: DISCONTINUED | OUTPATIENT
Start: 2023-05-12 | End: 2023-05-12 | Stop reason: HOSPADM

## 2023-05-12 RX ORDER — SODIUM CHLORIDE, SODIUM LACTATE, POTASSIUM CHLORIDE, CALCIUM CHLORIDE 600; 310; 30; 20 MG/100ML; MG/100ML; MG/100ML; MG/100ML
9 INJECTION, SOLUTION INTRAVENOUS CONTINUOUS
Status: DISCONTINUED | OUTPATIENT
Start: 2023-05-12 | End: 2023-05-12 | Stop reason: HOSPADM

## 2023-05-12 RX ORDER — LIDOCAINE HYDROCHLORIDE 20 MG/ML
INJECTION, SOLUTION INFILTRATION; PERINEURAL AS NEEDED
Status: DISCONTINUED | OUTPATIENT
Start: 2023-05-12 | End: 2023-05-12 | Stop reason: SURG

## 2023-05-12 RX ORDER — MAGNESIUM HYDROXIDE 1200 MG/15ML
LIQUID ORAL AS NEEDED
Status: DISCONTINUED | OUTPATIENT
Start: 2023-05-12 | End: 2023-05-12 | Stop reason: HOSPADM

## 2023-05-12 RX ORDER — ACETAMINOPHEN 500 MG
1000 TABLET ORAL ONCE
Status: COMPLETED | OUTPATIENT
Start: 2023-05-12 | End: 2023-05-12

## 2023-05-12 RX ORDER — OXYCODONE AND ACETAMINOPHEN 7.5; 325 MG/1; MG/1
1 TABLET ORAL EVERY 4 HOURS PRN
Status: DISCONTINUED | OUTPATIENT
Start: 2023-05-12 | End: 2023-05-12 | Stop reason: HOSPADM

## 2023-05-12 RX ORDER — SODIUM CHLORIDE 0.9 % (FLUSH) 0.9 %
3 SYRINGE (ML) INJECTION EVERY 12 HOURS SCHEDULED
Status: DISCONTINUED | OUTPATIENT
Start: 2023-05-12 | End: 2023-05-12 | Stop reason: HOSPADM

## 2023-05-12 RX ORDER — PROMETHAZINE HYDROCHLORIDE 25 MG/1
25 SUPPOSITORY RECTAL ONCE AS NEEDED
Status: DISCONTINUED | OUTPATIENT
Start: 2023-05-12 | End: 2023-05-12 | Stop reason: HOSPADM

## 2023-05-12 RX ORDER — PROPOFOL 10 MG/ML
VIAL (ML) INTRAVENOUS CONTINUOUS PRN
Status: DISCONTINUED | OUTPATIENT
Start: 2023-05-12 | End: 2023-05-12 | Stop reason: SURG

## 2023-05-12 RX ORDER — BUPIVACAINE HYDROCHLORIDE AND EPINEPHRINE 5; 5 MG/ML; UG/ML
INJECTION, SOLUTION EPIDURAL; INTRACAUDAL; PERINEURAL AS NEEDED
Status: DISCONTINUED | OUTPATIENT
Start: 2023-05-12 | End: 2023-05-12 | Stop reason: HOSPADM

## 2023-05-12 RX ADMIN — SODIUM CHLORIDE, POTASSIUM CHLORIDE, SODIUM LACTATE AND CALCIUM CHLORIDE: 600; 310; 30; 20 INJECTION, SOLUTION INTRAVENOUS at 11:12

## 2023-05-12 RX ADMIN — FAMOTIDINE 20 MG: 10 INJECTION INTRAVENOUS at 10:49

## 2023-05-12 RX ADMIN — PROPOFOL 100 MG: 10 INJECTION, EMULSION INTRAVENOUS at 11:25

## 2023-05-12 RX ADMIN — LIDOCAINE HYDROCHLORIDE 50 MG: 20 INJECTION, SOLUTION INFILTRATION; PERINEURAL at 11:25

## 2023-05-12 RX ADMIN — SODIUM CHLORIDE, POTASSIUM CHLORIDE, SODIUM LACTATE AND CALCIUM CHLORIDE 9 ML/HR: 600; 310; 30; 20 INJECTION, SOLUTION INTRAVENOUS at 10:49

## 2023-05-12 RX ADMIN — Medication 150 MCG/KG/MIN: at 11:26

## 2023-05-12 RX ADMIN — PROPOFOL 50 MG: 10 INJECTION, EMULSION INTRAVENOUS at 11:32

## 2023-05-12 RX ADMIN — ACETAMINOPHEN 1000 MG: 500 TABLET ORAL at 10:49

## 2023-05-12 NOTE — OP NOTE
PREOPERATIVE DIAGNOSIS:  Posterior neck soft tissue tumor    POSTOPERATIVE DIAGNOSIS (FINDINGS):  Same    PROCEDURE:  Excision of 5.5 cm posterior neck subcutaneous soft tissue tumor, probable lipoma    SURGEON:  Uli House MD    ASSISTANT:  Elton Caldwell was responsible for performing the following activities: suction, irrigation, suturing, closing, retraction, and placing dressing, and their skilled assistance was necessary for the success of this case.    ANESTHESIA:  MAC    EBL:  Minimal    SPECIMEN(S):  Soft tissue tumor    DESCRIPTION:  In left side down lateral decubitus position under sedation prepped and draped usual sterile manner.  Half percent Marcaine with epinephrine infiltrated locally.  Elliptical incision made excising the cutaneous scar used for previous excision.  This was carried down to the tumor in question which was circumferentially dissected and excised completely with the electrocautery.  Measured 5.5 cm.  Good hemostasis ensured with electrocautery.  Subcutaneous and deep dermal layers closed with interrupted 3-0 Vicryl.  Skin closed with 4-0 Vicryl subcuticular followed by Exofin.  Tolerated well.    Uli House M.D.

## 2023-05-12 NOTE — ANESTHESIA POSTPROCEDURE EVALUATION
Patient: Jared Restrepo    Procedure Summary     Date: 05/12/23 Room / Location:  [facility]  /  [facility]    Anesthesia Start: 1112 Anesthesia Stop: 1215    Procedure: EXCISION MASS POSTERIOR RIGHT HEAD/NECK Diagnosis:       Lipoma of neck      (Lipoma of neck [D17.0])    Surgeons: Uli House MD Provider: Elton Burris MD    Anesthesia Type: MAC ASA Status: 3          Anesthesia Type: MAC    Vitals  Vitals Value Taken Time   /77 05/12/23 1220   Temp 36.4 °C (97.6 °F) 05/12/23 1215   Pulse 71 05/12/23 1220   Resp 16 05/12/23 1220   SpO2 100 % 05/12/23 1220           Post Anesthesia Care and Evaluation    Patient location during evaluation: bedside  Patient participation: complete - patient participated  Level of consciousness: awake  Pain management: adequate    Airway patency: patent  Anesthetic complications: No anesthetic complications    Cardiovascular status: acceptable  Respiratory status: acceptable  Hydration status: acceptable    Comments: */77 (BP Location: Right arm, Patient Position: Lying)   Pulse 71   Temp 36.4 °C (97.6 °F) (Oral)   Resp 16   SpO2 100%

## 2023-05-12 NOTE — DISCHARGE INSTRUCTIONS
Dr. Uli House  [address]  [place] [id number] [phone]    Discharge Instructions for Minor Surgery    Go home, rest and take it easy today; however, you should get up and move about several times today to reduce the risk of developing a clot in your legs.      You may experience some dizziness or memory loss from the anesthesia.  This may last for the next 24 hours.  Someone should plan on staying with you for the first 24 hours for your safety.    Do not make any important legal decisions or sign any legal papers for the next 24 hours.      Eat and drink lightly today.  Start off with liquids, jello, soup, crackers or other bland foods at first. You may advance your diet tomorrow as tolerated as long as you do not experience any nausea or vomiting.     If skin glue (Dermabond) was used, your incisions are protected and covered.  The invisible glue will dissolve on its own as your incision heals. If dressings were used, you may remove your outer dressings in 3-4 days.   Please leave the little white tapes known as steri-strips alone.  They usually fall off in 1-2 weeks.  Do not worry if they come off sooner.     If dressings were used, you may notice some bleeding/drainage on your outer dressings. A little bloody drainage is normal. If the bleeding/drainage is such that the bandage cannot absorb it, remove the dressing, apply clean gauze and apply firm pressure for a full 15 minutes.  If the bleeding continues, please call me.    You may shower tomorrow allowing water to run over your incisions; however, do not scrub your incisions.  No tub baths until your incisions are completely healed.    You have received a prescription for a narcotic pain medicine, as you may have some pain/discomfort following surgery.   You will not be totally pain free, but your pain medicine should make the pain tolerable.  Please take your pain medicine as prescribed and always take your pills with food to  prevent nausea. If you are having severe pain that cannot be controlled by the pain medicine, please contact me.  If the pain is such that narcotic pain medicine is not required, you may take Tylenol or Ibuprofen as directed unless indicated otherwise.      You have also received a prescription for an anti-nausea medicine.  Please take this as prescribed for any nausea or vomiting.  Nausea could be a result of the anesthesia or a result of the narcotic pain medicine.  If you experience severe nausea and vomiting that cannot be controlled by the nausea medicine, please call me.      No driving for 24 hours and for as long as you are taking your prescription pain medicine.      Please call the office at [phone] to schedule a follow-up in about 1 week.      Remember to contact me for any of the following:    Fever> 101 degrees  Severe pain that cannot be controlled by taking your pain pills  Severe nausea or vomiting that cannot be controlled by taking your nausea medicine  Significant bleeding from your incision  Drainage that has a bad smell or is yellow or green in appearance  Any other questions or concerns

## 2023-05-12 NOTE — ANESTHESIA PREPROCEDURE EVALUATION
Anesthesia Evaluation     no history of anesthetic complications:  NPO Solid Status: > 8 hours  NPO Liquid Status: > 2 hours           Airway   Mallampati: II  Neck ROM: full  no difficulty expected  Dental - normal exam     Pulmonary - negative pulmonary ROS and normal exam   (-) COPD, asthma, sleep apnea, not a smoker    PE comment: nonlabored  Cardiovascular - normal exam    Rhythm: regular  Rate: normal    (+) hypertension, hyperlipidemia,   (-) valvular problems/murmurs, past MI, CAD, dysrhythmias, angina      Neuro/Psych- negative ROS  (-) seizures, TIA, CVA  GI/Hepatic/Renal/Endo    (+)   diabetes mellitus type 2 well controlled,   (-) GERD, liver disease, no renal disease, no thyroid disorder    Musculoskeletal (-) negative ROS    Abdominal    Substance History      OB/GYN          Other                        Anesthesia Plan    ASA 3     MAC       Anesthetic plan, risks, benefits, and alternatives have been provided, discussed and informed consent has been obtained with: patient.        CODE STATUS:

## 2023-05-13 LAB
DX PRELIMINARY: NORMAL
LAB AP CASE REPORT: NORMAL
LAB AP DIAGNOSIS COMMENT: NORMAL
PATH REPORT.GROSS SPEC: NORMAL

## 2023-05-18 ENCOUNTER — OFFICE VISIT (OUTPATIENT)
Dept: SURGERY | Facility: CLINIC | Age: 58
End: 2023-05-18
Payer: COMMERCIAL

## 2023-05-18 VITALS — HEIGHT: 72 IN | WEIGHT: 216 LBS | BODY MASS INDEX: 29.26 KG/M2

## 2023-05-18 DIAGNOSIS — Z09 ENCOUNTER FOR FOLLOW-UP: Primary | ICD-10-CM

## 2023-05-18 LAB
DX PRELIMINARY: NORMAL
LAB AP CASE REPORT: NORMAL
LAB AP DIAGNOSIS COMMENT: NORMAL
PATH REPORT.FINAL DX SPEC: NORMAL
PATH REPORT.GROSS SPEC: NORMAL

## 2023-05-18 NOTE — PROGRESS NOTES
This is a 58-year-old gentleman returning to the office today status post Excision of 5.5 cm posterior neck subcutaneous soft tissue tumor which pathology confirmed to be a lipoma that was performed on 5/12/2023.  Overall he is doing well since surgery with just a mild amount of itching around his incision.  The skin glue is continuing to peel off I informed him this likely represents irritation from this and that this should resolve once the skin glue has been removed.  I informed him he may return to all activities as tolerated using his body as his guide.  All questions were answered and he was willing to proceed with all recommendations.    Elton Caldwell PA-C

## 2023-05-30 LAB
DX PRELIMINARY: NORMAL
LAB AP CASE REPORT: NORMAL
LAB AP DIAGNOSIS COMMENT: NORMAL
PATH REPORT.ADDENDUM SPEC: NORMAL
PATH REPORT.FINAL DX SPEC: NORMAL
PATH REPORT.GROSS SPEC: NORMAL

## 2023-06-12 DIAGNOSIS — E78.2 MIXED HYPERLIPIDEMIA: ICD-10-CM

## 2023-06-12 DIAGNOSIS — I10 ESSENTIAL (PRIMARY) HYPERTENSION: ICD-10-CM

## 2023-06-13 RX ORDER — ATENOLOL AND CHLORTHALIDONE TABLET 50; 25 MG/1; MG/1
0.5 TABLET ORAL DAILY
Qty: 45 TABLET | Refills: 0 | Status: SHIPPED | OUTPATIENT
Start: 2023-06-13

## 2023-06-13 RX ORDER — ATORVASTATIN CALCIUM 10 MG/1
TABLET, FILM COATED ORAL
Qty: 90 TABLET | Refills: 0 | Status: SHIPPED | OUTPATIENT
Start: 2023-06-13

## 2023-06-13 RX ORDER — LISINOPRIL 10 MG/1
TABLET ORAL
Qty: 90 TABLET | Refills: 0 | Status: SHIPPED | OUTPATIENT
Start: 2023-06-13

## 2023-06-13 NOTE — TELEPHONE ENCOUNTER
Rx Refill Note  Requested Prescriptions     Pending Prescriptions Disp Refills    atorvastatin (LIPITOR) 10 MG tablet [Pharmacy Med Name: ATORVASTATIN TAB 10MG] 90 tablet 0     Sig: TAKE 1 TABLET DAILY    lisinopril (PRINIVIL,ZESTRIL) 10 MG tablet [Pharmacy Med Name: LISINOPRIL TAB 10MG] 90 tablet 0     Sig: TAKE 1 TABLET DAILY    atenolol-chlorthalidone (TENORETIC) 50-25 MG per tablet [Pharmacy Med Name: ATENOL/CHLOR TAB 50-25MG] 45 tablet 0     Sig: TAKE 1/2 TABLET DAILY      Last office visit with prescribing clinician: 3/21/2023   Last telemedicine visit with prescribing clinician: Visit date not found   Next office visit with prescribing clinician: Visit date not found  ACE Inhibitors Protocol Failed   Basic Metabolic Panel (05/05/2023 08:34) Lipid Panel (03/21/2023 11:11)     Marie Monroe LPN  06/13/23, 08:56 EDT

## 2023-09-11 DIAGNOSIS — I10 ESSENTIAL (PRIMARY) HYPERTENSION: ICD-10-CM

## 2023-09-11 DIAGNOSIS — E78.2 MIXED HYPERLIPIDEMIA: ICD-10-CM

## 2023-09-12 RX ORDER — ATENOLOL AND CHLORTHALIDONE TABLET 50; 25 MG/1; MG/1
TABLET ORAL
Qty: 45 TABLET | Refills: 0 | Status: SHIPPED | OUTPATIENT
Start: 2023-09-12

## 2023-09-12 RX ORDER — ATORVASTATIN CALCIUM 10 MG/1
TABLET, FILM COATED ORAL
Qty: 90 TABLET | Refills: 0 | Status: SHIPPED | OUTPATIENT
Start: 2023-09-12

## 2023-09-12 RX ORDER — LISINOPRIL 10 MG/1
TABLET ORAL
Qty: 90 TABLET | Refills: 0 | Status: SHIPPED | OUTPATIENT
Start: 2023-09-12

## 2023-09-12 NOTE — TELEPHONE ENCOUNTER
Rx Refill Note  Requested Prescriptions     Pending Prescriptions Disp Refills    lisinopril (PRINIVIL,ZESTRIL) 10 MG tablet [Pharmacy Med Name: LISINOPRIL TAB 10MG] 90 tablet 0     Sig: TAKE 1 TABLET DAILY    atorvastatin (LIPITOR) 10 MG tablet [Pharmacy Med Name: ATORVASTATIN TAB 10MG] 90 tablet 0     Sig: TAKE 1 TABLET DAILY    atenolol-chlorthalidone (TENORETIC) 50-25 MG per tablet [Pharmacy Med Name: ATENOL/CHLOR TAB 50-25MG] 45 tablet 0     Sig: TAKE 1/2 TABLET DAILY      Last office visit with prescribing clinician: 3/21/2023   Last telemedicine visit with prescribing clinician: Visit date not found   Next office visit with prescribing clinician: Visit date not found    ACE Inhibitors Protocol Failed     Marie Monroe LPN  09/12/23, 10:01 EDT

## 2023-09-12 NOTE — TELEPHONE ENCOUNTER
Get him scheduled, then send in at least 30 days, but if get him on schedule can send 90, is it a mail order pharmacy or local?

## 2023-10-09 ENCOUNTER — OFFICE VISIT (OUTPATIENT)
Dept: FAMILY MEDICINE CLINIC | Age: 58
End: 2023-10-09
Payer: COMMERCIAL

## 2023-10-09 ENCOUNTER — LAB (OUTPATIENT)
Dept: LAB | Facility: HOSPITAL | Age: 58
End: 2023-10-09
Payer: COMMERCIAL

## 2023-10-09 VITALS
SYSTOLIC BLOOD PRESSURE: 128 MMHG | BODY MASS INDEX: 29.26 KG/M2 | WEIGHT: 216 LBS | HEIGHT: 72 IN | HEART RATE: 63 BPM | TEMPERATURE: 97.9 F | DIASTOLIC BLOOD PRESSURE: 69 MMHG

## 2023-10-09 DIAGNOSIS — I10 ESSENTIAL (PRIMARY) HYPERTENSION: ICD-10-CM

## 2023-10-09 DIAGNOSIS — E78.2 MIXED HYPERLIPIDEMIA: ICD-10-CM

## 2023-10-09 DIAGNOSIS — E11.9 TYPE 2 DIABETES MELLITUS WITHOUT COMPLICATION, WITHOUT LONG-TERM CURRENT USE OF INSULIN: ICD-10-CM

## 2023-10-09 DIAGNOSIS — I10 ESSENTIAL (PRIMARY) HYPERTENSION: Primary | ICD-10-CM

## 2023-10-09 LAB
ALBUMIN SERPL-MCNC: 4.7 G/DL (ref 3.5–5.2)
ALBUMIN UR-MCNC: <1.2 MG/DL
ALBUMIN/GLOB SERPL: 1.8 G/DL
ALP SERPL-CCNC: 93 U/L (ref 39–117)
ALT SERPL W P-5'-P-CCNC: 25 U/L (ref 1–41)
ANION GAP SERPL CALCULATED.3IONS-SCNC: 11.5 MMOL/L (ref 5–15)
AST SERPL-CCNC: 19 U/L (ref 1–40)
BILIRUB SERPL-MCNC: 0.7 MG/DL (ref 0–1.2)
BUN SERPL-MCNC: 13 MG/DL (ref 6–20)
BUN/CREAT SERPL: 11.8 (ref 7–25)
CALCIUM SPEC-SCNC: 10 MG/DL (ref 8.6–10.5)
CHLORIDE SERPL-SCNC: 98 MMOL/L (ref 98–107)
CHOLEST SERPL-MCNC: 116 MG/DL (ref 0–200)
CO2 SERPL-SCNC: 28.5 MMOL/L (ref 22–29)
CREAT SERPL-MCNC: 1.1 MG/DL (ref 0.76–1.27)
CREAT UR-MCNC: 138.5 MG/DL
EGFRCR SERPLBLD CKD-EPI 2021: 77.8 ML/MIN/1.73
GLOBULIN UR ELPH-MCNC: 2.6 GM/DL
GLUCOSE SERPL-MCNC: 153 MG/DL (ref 65–99)
HBA1C MFR BLD: 6.7 % (ref 4.8–5.6)
HDLC SERPL-MCNC: 38 MG/DL (ref 40–60)
LDLC SERPL CALC-MCNC: 52 MG/DL (ref 0–100)
LDLC/HDLC SERPL: 1.24 {RATIO}
MICROALBUMIN/CREAT UR: NORMAL MG/G{CREAT}
POTASSIUM SERPL-SCNC: 3.9 MMOL/L (ref 3.5–5.2)
PROT SERPL-MCNC: 7.3 G/DL (ref 6–8.5)
SODIUM SERPL-SCNC: 138 MMOL/L (ref 136–145)
TRIGL SERPL-MCNC: 154 MG/DL (ref 0–150)
VLDLC SERPL-MCNC: 26 MG/DL (ref 5–40)

## 2023-10-09 PROCEDURE — 36415 COLL VENOUS BLD VENIPUNCTURE: CPT

## 2023-10-09 PROCEDURE — 80053 COMPREHEN METABOLIC PANEL: CPT

## 2023-10-09 PROCEDURE — 80061 LIPID PANEL: CPT

## 2023-10-09 PROCEDURE — 83036 HEMOGLOBIN GLYCOSYLATED A1C: CPT

## 2023-10-09 PROCEDURE — 82570 ASSAY OF URINE CREATININE: CPT

## 2023-10-09 PROCEDURE — 82043 UR ALBUMIN QUANTITATIVE: CPT

## 2023-10-09 RX ORDER — LISINOPRIL 10 MG/1
10 TABLET ORAL DAILY
Qty: 90 TABLET | Refills: 1 | Status: SHIPPED | OUTPATIENT
Start: 2023-10-09

## 2023-10-09 RX ORDER — ATENOLOL AND CHLORTHALIDONE TABLET 50; 25 MG/1; MG/1
0.5 TABLET ORAL DAILY
Qty: 45 TABLET | Refills: 1 | Status: SHIPPED | OUTPATIENT
Start: 2023-10-09

## 2023-10-09 RX ORDER — ATORVASTATIN CALCIUM 10 MG/1
10 TABLET, FILM COATED ORAL DAILY
Qty: 90 TABLET | Refills: 1 | Status: SHIPPED | OUTPATIENT
Start: 2023-10-09

## 2023-10-09 NOTE — ASSESSMENT & PLAN NOTE
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    Declines flu vaccine today.

## 2023-10-09 NOTE — PROGRESS NOTES
Chief Complaint  Hypertension (6 month follow up HTN, Diabetes, HLD. )    Subjective          Jared Restrepo presents to [facility]    History of Present Illness  Diabetes:  Current medication: metformin/ refills not due   Tolerating medication: Yes  Last eye exam: next month   Last foot exam: 3-2023  At home BS ranges:  avg 145 on dexcom   Lab Results       Component                Value               Date                       HGBA1C                   7.00 (H)            03/21/2023                Hyperlipidemia  Current medication: lipitor   Tolerating medication: Yes  Needs Refill: Yes to [facility]     Lab Results       Component                Value               Date                       CHOL                     115                 03/21/2023                 CHLPL                    164                 01/07/2021                 TRIG                     81                  03/21/2023                 HDL                      42                  03/21/2023                 LDL                      57                  03/21/2023                Hypertension:  Current medication: lisinopril /tenoretic   Tolerating Medication: Yes  Checking BP at home and it is: 120's over 80's   Needs refills: Yes [facility]   Labs:  Lab Results       Component                Value               Date                       GLUCOSE                  147 (H)             05/05/2023                 BUN                      17                  05/05/2023                 CREATININE               0.98                05/05/2023                 EGFRIFAFRI               99                  01/17/2022                 BCR                      17.3                05/05/2023                 K                        3.4 (L)             05/05/2023                 CO2                      26.2                05/05/2023                 CALCIUM                  9.2                 05/05/2023                 ALBUMIN                   4.7                 2023                 LABIL2                   1.5                 2021                 AST                      21                  2023                 ALT                      22                  2023              Past Medical History: changes since 3-2023:          Hypertension         Surgical History:         lipoma;  removed     Procedures:    Colonoscopy ( 3-17-16 normal [facility] )         Family History:     Father:  at age 40's; Cause of death was pancreatitis    ; Positive for Type 2 Diabetes;     Mother:    Positive for Arrhythmia;     ; Positive for Lung Cancer;     Brother(s): 2 brother(s) total    ; Positive for Type 2 Diabetes;     Sister(s): 1 sister(s) total    ; Positive for thyroid disorder;     Daughter(s): Healthy; 1 daughter(s) total     Paternal Grandfather: Cause of death was cancer     Paternal Grandmother: Cause of death was cancer     Maternal Grandfather:      Maternal Grandmother:  Father:  at age 40's; Cause of death was pancreatitis     Mother: Lung Cancer     Son(s): 3 son(s) total; 1 ;  MVA     Daughter(s): Healthy; 1 daughter(s) total         Social History:     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     Marital Status:      Children: 4 children                Past Medical History:   Diagnosis Date    Essential (primary) hypertension     Hypercholesterolemia     Lipoma     Mixed hyperlipidemia     Type 2 diabetes mellitus without complication        Allergies   Allergen Reactions    Penicillins Hives        Past Surgical History:   Procedure Laterality Date    COLONOSCOPY  2016    Normal; [facility]    COLONOSCOPY      @ [facility]    EXCISION MASS HEAD/NECK N/A 2023    Procedure: EXCISION MASS POSTERIOR RIGHT HEAD/NECK;  Surgeon: Uli House MD;  [facility] [place] [facility];  Service: General;  Laterality: N/A;    LIPOMA EXCISION Right     NECK/HEAD AREA     TONSILLECTOMY          Social History     Tobacco Use    Smoking status: Never    Smokeless tobacco: Never   Substance Use Topics    Alcohol use: Yes     Alcohol/week: 1.0 standard drink of alcohol     Types: 1 Cans of beer per week     Comment: ON OCC       Family History   Problem Relation Age of Onset    Arrhythmia Mother     Lung cancer Mother     Cancer Mother     COPD Mother     Pancreatitis Father     Diabetes type II Father     Cancer Father     Thyroid disease Sister     Diabetes Sister     Diabetes type II Brother     Diabetes Brother     Diabetes Brother     Cancer Paternal Grandmother     Cancer Paternal Grandfather     Malig Hyperthermia Neg Hx         Health Maintenance Due   Topic Date Due    Hepatitis B (1 of 3 - 3-dose series) Never done    Pneumococcal Vaccine 0-64 (1 - PCV) Never done    ZOSTER VACCINE (1 of 2) Never done    ANNUAL PHYSICAL  Never done    INFLUENZA VACCINE  Never done    HEMOGLOBIN A1C  09/21/2023    URINE MICROALBUMIN  09/30/2023        Current Outpatient Medications on File Prior to Visit   Medication Sig    Continuous Blood Gluc  (Dexcom G6 ) device 1 each Continuous.    Continuous Blood Gluc Sensor (Dexcom G6 Sensor) USE EVERY 10 DAYS    Continuous Blood Gluc Transmit (Dexcom G6 Transmitter) misc 1 each Continuous.    metFORMIN (GLUCOPHAGE) 1000 MG tablet Take 1 tablet by mouth 2 (Two) Times a Day With Meals. (Patient taking differently: Take 1 tablet by mouth Daily With Breakfast.)    POTASSIUM PO Take 1 tablet by mouth See Admin Instructions. ONCE A WEEK AS NEEDED    [DISCONTINUED] atenolol-chlorthalidone (TENORETIC) 50-25 MG per tablet TAKE 1/2 TABLET DAILY    [DISCONTINUED] atorvastatin (LIPITOR) 10 MG tablet TAKE 1 TABLET DAILY    [DISCONTINUED] lisinopril (PRINIVIL,ZESTRIL) 10 MG tablet TAKE 1 TABLET DAILY     No current facility-administered medications on file prior to visit.       Immunization History   Administered Date(s) Administered    COVID-19  "(PFIZER) Purple Cap Monovalent 03/19/2021, 04/16/2021, 12/06/2021    Hepatitis A 05/02/2018, 02/12/2019    Tdap 09/30/2022       Review of Systems   Constitutional:  Negative for fatigue and fever.   Respiratory:  Negative for cough and shortness of breath.    Cardiovascular:  Negative for chest pain, palpitations and leg swelling.        Objective     Vitals:    10/09/23 0957   BP: 128/69   BP Location: Right arm   Patient Position: Sitting   Cuff Size: Large Adult   Pulse: 63   Temp: 97.9 øF (36.6 øC)   TempSrc: Oral   Weight: 98 kg (216 lb)   Height: 182.9 cm (72\")            Physical Exam  Vitals reviewed.   Constitutional:       General: He is not in acute distress.     Appearance: Normal appearance.   Neck:      Vascular: No carotid bruit.   Cardiovascular:      Rate and Rhythm: Normal rate and regular rhythm.      Heart sounds: Normal heart sounds. No murmur heard.  Pulmonary:      Effort: Pulmonary effort is normal. No respiratory distress.      Breath sounds: Normal breath sounds.   Musculoskeletal:      Right lower leg: No edema.      Left lower leg: No edema.   Neurological:      Mental Status: He is alert.   Psychiatric:         Mood and Affect: Mood normal.         Behavior: Behavior normal.         Result Review :     The following data was reviewed by: CHRISTELLE Mar on 10/09/2023:                       Assessment and Plan      Diagnoses and all orders for this visit:    1. Essential (primary) hypertension (Primary)  Assessment & Plan:  Hypertension is stable. Continue to monitor BP at home. Continue current meds. Continue to modify diet and lifestyle.     Orders:  -     Comprehensive Metabolic Panel; Future  -     atenolol-chlorthalidone (TENORETIC) 50-25 MG per tablet; Take 0.5 tablets by mouth Daily.  Dispense: 45 tablet; Refill: 1  -     lisinopril (PRINIVIL,ZESTRIL) 10 MG tablet; Take 1 tablet by mouth Daily.  Dispense: 90 tablet; Refill: 1    2. Mixed hyperlipidemia  Assessment & " Plan:  Continue current medication and efforts with diet and exercise.       Orders:  -     Lipid Panel; Future  -     atorvastatin (LIPITOR) 10 MG tablet; Take 1 tablet by mouth Daily.  Dispense: 90 tablet; Refill: 1    3. 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    Declines flu vaccine today.       Orders:  -     Comprehensive Metabolic Panel; Future  -     Lipid Panel; Future  -     Hemoglobin A1c; Future  -     Microalbumin / Creatinine Urine Ratio - Urine, Clean Catch; Future                    Follow Up     Return for followup pending lab results.    Patient was given instructions and counseling regarding his condition or for health maintenance advice. Please see specific information pulled into the AVS if appropriate.

## 2023-12-13 RX ORDER — PROCHLORPERAZINE 25 MG/1
SUPPOSITORY RECTAL
Qty: 1 EACH | Refills: 3 | Status: SHIPPED | OUTPATIENT
Start: 2023-12-13

## 2024-01-19 DIAGNOSIS — E11.9 TYPE 2 DIABETES MELLITUS WITHOUT COMPLICATION, WITHOUT LONG-TERM CURRENT USE OF INSULIN: Primary | ICD-10-CM

## 2024-01-19 NOTE — TELEPHONE ENCOUNTER
Rx Refill Note  Requested Prescriptions     Pending Prescriptions Disp Refills    metFORMIN (GLUCOPHAGE) 1000 MG tablet 180 tablet 0     Sig: Take 1 tablet by mouth 2 (Two) Times a Day With Meals.      Last office visit with prescribing clinician: 10/9/2023   Last telemedicine visit with prescribing clinician: Visit date not found   Next office visit with prescribing clinician: 4/15/2024  Hemoglobin A1c (10/09/2023 10:39)     Marie Monroe LPN  01/19/24, 13:33 EST

## 2024-03-17 DIAGNOSIS — I10 ESSENTIAL (PRIMARY) HYPERTENSION: ICD-10-CM

## 2024-03-17 DIAGNOSIS — E11.9 TYPE 2 DIABETES MELLITUS WITHOUT COMPLICATION, WITHOUT LONG-TERM CURRENT USE OF INSULIN: ICD-10-CM

## 2024-03-18 RX ORDER — ATENOLOL AND CHLORTHALIDONE TABLET 50; 25 MG/1; MG/1
0.5 TABLET ORAL DAILY
Qty: 45 TABLET | Refills: 1 | Status: SHIPPED | OUTPATIENT
Start: 2024-03-18

## 2024-04-15 ENCOUNTER — LAB (OUTPATIENT)
Dept: LAB | Facility: HOSPITAL | Age: 59
End: 2024-04-15
Payer: COMMERCIAL

## 2024-04-15 ENCOUNTER — OFFICE VISIT (OUTPATIENT)
Dept: FAMILY MEDICINE CLINIC | Age: 59
End: 2024-04-15
Payer: COMMERCIAL

## 2024-04-15 VITALS
TEMPERATURE: 98.4 F | DIASTOLIC BLOOD PRESSURE: 83 MMHG | HEIGHT: 72 IN | BODY MASS INDEX: 29.12 KG/M2 | SYSTOLIC BLOOD PRESSURE: 130 MMHG | WEIGHT: 215 LBS | HEART RATE: 71 BPM

## 2024-04-15 DIAGNOSIS — E11.9 TYPE 2 DIABETES MELLITUS WITHOUT COMPLICATION, WITHOUT LONG-TERM CURRENT USE OF INSULIN: ICD-10-CM

## 2024-04-15 DIAGNOSIS — Z12.5 SCREENING FOR PROSTATE CANCER: ICD-10-CM

## 2024-04-15 DIAGNOSIS — Z00.00 ROUTINE GENERAL MEDICAL EXAMINATION AT A HEALTH CARE FACILITY: ICD-10-CM

## 2024-04-15 DIAGNOSIS — E78.2 MIXED HYPERLIPIDEMIA: ICD-10-CM

## 2024-04-15 DIAGNOSIS — I10 ESSENTIAL (PRIMARY) HYPERTENSION: ICD-10-CM

## 2024-04-15 DIAGNOSIS — I10 ESSENTIAL (PRIMARY) HYPERTENSION: Primary | ICD-10-CM

## 2024-04-15 PROBLEM — H69.91 ETD (EUSTACHIAN TUBE DYSFUNCTION), RIGHT: Status: RESOLVED | Noted: 2022-01-17 | Resolved: 2024-04-15

## 2024-04-15 LAB
ALBUMIN SERPL-MCNC: 4.6 G/DL (ref 3.5–5.2)
ALBUMIN/GLOB SERPL: 1.8 G/DL
ALP SERPL-CCNC: 82 U/L (ref 39–117)
ALT SERPL W P-5'-P-CCNC: 27 U/L (ref 1–41)
ANION GAP SERPL CALCULATED.3IONS-SCNC: 15.9 MMOL/L (ref 5–15)
AST SERPL-CCNC: 24 U/L (ref 1–40)
BASOPHILS # BLD AUTO: 0.02 10*3/MM3 (ref 0–0.2)
BASOPHILS NFR BLD AUTO: 0.4 % (ref 0–1.5)
BILIRUB SERPL-MCNC: 1.3 MG/DL (ref 0–1.2)
BUN SERPL-MCNC: 20 MG/DL (ref 6–20)
BUN/CREAT SERPL: 17.4 (ref 7–25)
CALCIUM SPEC-SCNC: 9.8 MG/DL (ref 8.6–10.5)
CHLORIDE SERPL-SCNC: 99 MMOL/L (ref 98–107)
CHOLEST SERPL-MCNC: 137 MG/DL (ref 0–200)
CO2 SERPL-SCNC: 26.1 MMOL/L (ref 22–29)
CREAT SERPL-MCNC: 1.15 MG/DL (ref 0.76–1.27)
DEPRECATED RDW RBC AUTO: 39 FL (ref 37–54)
EGFRCR SERPLBLD CKD-EPI 2021: 73.3 ML/MIN/1.73
EOSINOPHIL # BLD AUTO: 0.05 10*3/MM3 (ref 0–0.4)
EOSINOPHIL NFR BLD AUTO: 1.1 % (ref 0.3–6.2)
ERYTHROCYTE [DISTWIDTH] IN BLOOD BY AUTOMATED COUNT: 12.1 % (ref 12.3–15.4)
GLOBULIN UR ELPH-MCNC: 2.6 GM/DL
GLUCOSE SERPL-MCNC: 168 MG/DL (ref 65–99)
HBA1C MFR BLD: 7.1 % (ref 4.8–5.6)
HCT VFR BLD AUTO: 41 % (ref 37.5–51)
HDLC SERPL-MCNC: 42 MG/DL (ref 40–60)
HGB BLD-MCNC: 14.4 G/DL (ref 13–17.7)
IMM GRANULOCYTES # BLD AUTO: 0.01 10*3/MM3 (ref 0–0.05)
IMM GRANULOCYTES NFR BLD AUTO: 0.2 % (ref 0–0.5)
LDLC SERPL CALC-MCNC: 81 MG/DL (ref 0–100)
LDLC/HDLC SERPL: 1.92 {RATIO}
LYMPHOCYTES # BLD AUTO: 1.96 10*3/MM3 (ref 0.7–3.1)
LYMPHOCYTES NFR BLD AUTO: 43.3 % (ref 19.6–45.3)
MCH RBC QN AUTO: 30.3 PG (ref 26.6–33)
MCHC RBC AUTO-ENTMCNC: 35.1 G/DL (ref 31.5–35.7)
MCV RBC AUTO: 86.3 FL (ref 79–97)
MONOCYTES # BLD AUTO: 0.49 10*3/MM3 (ref 0.1–0.9)
MONOCYTES NFR BLD AUTO: 10.8 % (ref 5–12)
NEUTROPHILS NFR BLD AUTO: 2 10*3/MM3 (ref 1.7–7)
NEUTROPHILS NFR BLD AUTO: 44.2 % (ref 42.7–76)
PLATELET # BLD AUTO: 206 10*3/MM3 (ref 140–450)
PMV BLD AUTO: 10.4 FL (ref 6–12)
POTASSIUM SERPL-SCNC: 3.9 MMOL/L (ref 3.5–5.2)
PROT SERPL-MCNC: 7.2 G/DL (ref 6–8.5)
PSA SERPL-MCNC: 0.7 NG/ML (ref 0–4)
RBC # BLD AUTO: 4.75 10*6/MM3 (ref 4.14–5.8)
SODIUM SERPL-SCNC: 141 MMOL/L (ref 136–145)
TRIGL SERPL-MCNC: 71 MG/DL (ref 0–150)
TSH SERPL DL<=0.05 MIU/L-ACNC: 1.05 UIU/ML (ref 0.27–4.2)
VLDLC SERPL-MCNC: 14 MG/DL (ref 5–40)
WBC NRBC COR # BLD AUTO: 4.53 10*3/MM3 (ref 3.4–10.8)

## 2024-04-15 PROCEDURE — 99396 PREV VISIT EST AGE 40-64: CPT | Performed by: NURSE PRACTITIONER

## 2024-04-15 PROCEDURE — 36415 COLL VENOUS BLD VENIPUNCTURE: CPT

## 2024-04-15 PROCEDURE — G0103 PSA SCREENING: HCPCS

## 2024-04-15 PROCEDURE — 83036 HEMOGLOBIN GLYCOSYLATED A1C: CPT

## 2024-04-15 PROCEDURE — 80061 LIPID PANEL: CPT

## 2024-04-15 PROCEDURE — 80050 GENERAL HEALTH PANEL: CPT

## 2024-04-15 NOTE — ASSESSMENT & PLAN NOTE
Hypertension is stable. Continue to monitor BP at home. Continue current meds. Continue to modify diet and lifestyle.

## 2024-04-15 NOTE — ASSESSMENT & PLAN NOTE
To work on diet, regular exercise, continue to monitor sugars, check feet daily, see optometrist yearly or as directed. He may switch to taking 500 mg of metformin BID instead of taking 1000 mg once a day, pending labs, work on his snacking, but may need to increase dose, pending labs

## 2024-04-15 NOTE — ASSESSMENT & PLAN NOTE
Advise regular exercise, healthy eating, always wear seat belts. Immunizations discussed, advised shingerx vaccine, to check with his pharmacy

## 2024-04-15 NOTE — PROGRESS NOTES
Chief Complaint  Annual Exam    Subjective          Jared Restrepo presents to [facility]    History of Present Illness  General Health Questions  Regular exercise as least 3 days a week: yes   Follows a healthy diet: yes  Wears seat belt always: yes   Drinks Alcohol: rarely   Uses Recreational drugs: no   Uses tobacco products: no  UTD on Tetanus vaccine: 9-2022  Last colon screen: 2016     Diabetes:  Current medication: metformin 1000 mg daily (sugars up at night but has been snacking late over the last six months)   Tolerating medication: Yes  Last eye exam:   Last foot exam: 3-2023  At home BS ranges: 120-135  Lab Results       Component                Value               Date                       HGBA1C                   6.70 (H)            10/09/2023                Hyperlipidemia  Current medication: lipitor  Tolerating medication: Yes  Needs Refill: no    Lab Results       Component                Value               Date                       CHOL                     116                 10/09/2023                 CHLPL                    164                 01/07/2021                 TRIG                     154 (H)             10/09/2023                 HDL                      38 (L)              10/09/2023                 LDL                      52                  10/09/2023                Hypertension:  Current medication: lisinopril / tenoretic   Tolerating Medication: Yes  Checking BP at home and it is: 120's/80's  Needs refills: No  Labs:  Lab Results       Component                Value               Date                       GLUCOSE                  153 (H)             10/09/2023                 BUN                      13                  10/09/2023                 CREATININE               1.10                10/09/2023                 EGFRIFAFRI               99                  01/17/2022                 BCR                      11.8                 10/09/2023                 K                        3.9                 10/09/2023                 CO2                      28.5                10/09/2023                 CALCIUM                  10.0                10/09/2023                 ALBUMIN                  4.7                 10/09/2023                 LABIL2                   1.5                 2021                 AST                      19                  10/09/2023                 ALT                      25                  10/09/2023              Past Medical History: changes since 10-9-2023:          Hypertension         Surgical History:         lipoma;  removed     Procedures:    Colonoscopy ( 3-17-16 normal [facility] )         Family History:         Positive for Arrhythmia;     ; Positive for Lung Cancer;     Brother(s): 2 brother(s) total    ; Positive for Type 2 Diabetes;     Sister(s): 1 sister(s) total    ; Positive for thyroid disorder;     Daughter(s): Healthy; 1 daughter(s) total     Paternal Grandfather: Cause of death was cancer     Paternal Grandmother: Cause of death was cancer     Maternal Grandfather:      Maternal Grandmother:    Father:  at age 40's; Cause of death was pancreatitis, T2DM    Mother: Lung Cancer     Son(s): 3 son(s) total; 1 ;  MVA     Daughter(s): Healthy; 1 daughter(s) total         Social History:       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)     Marital Status:      Children: 4 children, 3 living              Past Medical History:   Diagnosis Date    Essential (primary) hypertension     Hypercholesterolemia     Lipoma     Mixed hyperlipidemia     Type 2 diabetes mellitus without complication        Allergies   Allergen Reactions    Penicillins Hives        Past Surgical History:   Procedure Laterality Date    [facility]    COLONOSCOPY      @ [facility]    EXCISION MASS HEAD/NECK N/A 2023     Procedure: EXCISION MASS POSTERIOR RIGHT HEAD/NECK;  Surgeon: Uli House MD;  Location: [facility] OR [facility] [place];  Service: General;  Laterality: N/A;    LIPOMA EXCISION Right     NECK/HEAD AREA    TONSILLECTOMY          Social History     Tobacco Use    Smoking status: Never    Smokeless tobacco: Never   Substance Use Topics    Alcohol use: Yes     Alcohol/week: 1.0 standard drink of alcohol     Types: 1 Cans of beer per week     Comment: ON OCC       Family History   Problem Relation Age of Onset    Arrhythmia Mother     Lung cancer Mother     Cancer Mother     COPD Mother     Pancreatitis Father     Diabetes type II Father     Cancer Father     Thyroid disease Sister     Diabetes Sister     Diabetes type II Brother     Diabetes Brother     Diabetes Brother     Cancer Paternal Grandmother     Cancer Paternal Grandfather     Malig Hyperthermia Neg Hx         Health Maintenance Due   Topic Date Due    Pneumococcal Vaccine 0-64 (1 of 2 - PCV) Never done    Hepatitis B (1 of 3 - 19+ 3-dose series) Never done    ZOSTER VACCINE (1 of 2) Never done    HEMOGLOBIN A1C  04/09/2024        Current Outpatient Medications on File Prior to Visit   Medication Sig    atenolol-chlorthalidone (TENORETIC) 50-25 MG per tablet TAKE 1/2 TABLET DAILY    atorvastatin (LIPITOR) 10 MG tablet Take 1 tablet by mouth Daily.    Continuous Blood Gluc  (Dexcom G6 ) device 1 each Continuous.    Continuous Blood Gluc Sensor (Dexcom G6 Sensor) USE EVERY 10 DAYS    Continuous Blood Gluc Transmit (Dexcom G6 Transmitter) misc USE CONTINUOUS    lisinopril (PRINIVIL,ZESTRIL) 10 MG tablet Take 1 tablet by mouth Daily.    metFORMIN (GLUCOPHAGE) 1000 MG tablet TAKE 1 TABLET TWICE DAILY  WITH MEALS    POTASSIUM PO Take 1 tablet by mouth See Admin Instructions. ONCE A WEEK AS NEEDED     No current facility-administered medications on file prior to visit.       Immunization History   Administered Date(s) Administered    COVID-19 (PFIZER) Purple Cap  "Monovalent 03/19/2021, 04/16/2021, 12/06/2021    Hepatitis A 05/02/2018, 02/12/2019    Tdap 09/30/2022       Review of Systems   Constitutional:  Negative for fatigue and fever.   HENT:  Negative for ear pain and sore throat.    Eyes:  Negative for blurred vision.   Respiratory:  Negative for cough and shortness of breath.    Cardiovascular:  Negative for chest pain, palpitations and leg swelling.   Gastrointestinal:  Negative for abdominal pain, constipation, diarrhea, nausea and vomiting.   Musculoskeletal:  Negative for arthralgias and myalgias.   Skin:  Negative for rash.   Neurological:  Negative for dizziness, weakness and headache.   Psychiatric/Behavioral:  Negative for sleep disturbance and depressed mood.         Objective     Vitals:    04/15/24 1034   BP: 130/83   BP Location: Right arm   Patient Position: Sitting   Cuff Size: Large Adult   Pulse: 71   Temp: 98.4 °F (36.9 °C)   TempSrc: Oral   Weight: 97.5 kg (215 lb)   Height: 182.9 cm (72\")            Physical Exam  Vitals reviewed.   Constitutional:       Appearance: Normal appearance. He is well-developed.   HENT:      Head: Normocephalic and atraumatic.      Right Ear: There is impacted cerumen.      Left Ear: There is impacted cerumen (paritally occluded canal, TM visualized is normal).      Mouth/Throat:      Pharynx: No oropharyngeal exudate.   Eyes:      Conjunctiva/sclera: Conjunctivae normal.      Pupils: Pupils are equal, round, and reactive to light.   Neck:      Vascular: No carotid bruit.   Cardiovascular:      Rate and Rhythm: Normal rate and regular rhythm.      Pulses:           Posterior tibial pulses are 2+ on the right side and 2+ on the left side.      Heart sounds: No murmur heard.     No friction rub. No gallop.   Pulmonary:      Effort: Pulmonary effort is normal. No respiratory distress.      Breath sounds: Normal breath sounds. No wheezing or rhonchi.   Abdominal:      General: Bowel sounds are normal. There is no distension. "      Palpations: Abdomen is soft.      Tenderness: There is no abdominal tenderness.      Comments:       Musculoskeletal:         General: No swelling.   Feet:      Right foot:      Protective Sensation: 3 sites tested.  3 sites sensed.      Skin integrity: Skin integrity normal. No ulcer or blister.      Toenail Condition: Right toenails are normal.      Left foot:      Protective Sensation: 3 sites tested.  3 sites sensed.      Skin integrity: Skin integrity normal. No ulcer or blister.      Toenail Condition: Left toenails are normal.      Comments: Diabetic Foot Exam Performed and Monofilament Test Performed     Skin:     General: Skin is warm and dry.   Neurological:      Mental Status: He is alert and oriented to person, place, and time.      Cranial Nerves: No cranial nerve deficit.   Psychiatric:         Mood and Affect: Mood and affect normal.         Behavior: Behavior normal.         Thought Content: Thought content normal.         Judgment: Judgment normal.         Result Review :     The following data was reviewed by: CHRISTELLE Mar on 04/15/2024:                       Assessment and Plan      Diagnoses and all orders for this visit:    1. Essential (primary) hypertension (Primary)  Assessment & Plan:  Hypertension is stable. Continue to monitor BP at home. Continue current meds. Continue to modify diet and lifestyle.        Orders:  -     Comprehensive Metabolic Panel; Future    2. Mixed hyperlipidemia  Assessment & Plan:  Continue current medication and efforts with diet and exercise.       Orders:  -     Comprehensive Metabolic Panel; Future  -     Lipid Panel; Future    3. Type 2 diabetes mellitus without complication, without long-term current use of insulin  Assessment & Plan:  To work on diet, regular exercise, continue to monitor sugars, check feet daily, see optometrist yearly or as directed. He may switch to taking 500 mg of metformin BID instead of taking 1000 mg once a day,  pending labs, work on his snacking, but may need to increase dose, pending labs       Orders:  -     Comprehensive Metabolic Panel; Future  -     Lipid Panel; Future  -     Hemoglobin A1c; Future    4. Routine general medical examination at a health care facility  Assessment & Plan:  Advise regular exercise, healthy eating, always wear seat belts. Immunizations discussed, advised shingerx vaccine, to check with his pharmacy       Orders:  -     CBC w AUTO Differential; Future  -     TSH Rfx On Abnormal To Free T4    5. Screening for prostate cancer  Assessment & Plan:  Discussed, will screen with a PSA     Orders:  -     PSA SCREENING; Future                    Follow Up     Return for followup pending lab results.    Patient was given instructions and counseling regarding his condition or for health maintenance advice. Please see specific information pulled into the AVS if appropriate.

## 2024-06-03 DIAGNOSIS — I10 ESSENTIAL (PRIMARY) HYPERTENSION: ICD-10-CM

## 2024-06-04 RX ORDER — LISINOPRIL 10 MG/1
10 TABLET ORAL DAILY
Qty: 90 TABLET | Refills: 1 | Status: SHIPPED | OUTPATIENT
Start: 2024-06-04

## 2024-06-08 DIAGNOSIS — E11.9 TYPE 2 DIABETES MELLITUS WITHOUT COMPLICATION, WITHOUT LONG-TERM CURRENT USE OF INSULIN: ICD-10-CM

## 2024-06-12 DIAGNOSIS — E78.2 MIXED HYPERLIPIDEMIA: ICD-10-CM

## 2024-06-13 RX ORDER — ATORVASTATIN CALCIUM 10 MG/1
10 TABLET, FILM COATED ORAL DAILY
Qty: 90 TABLET | Refills: 1 | Status: SHIPPED | OUTPATIENT
Start: 2024-06-13

## 2024-08-26 DIAGNOSIS — E11.9 TYPE 2 DIABETES MELLITUS WITHOUT COMPLICATION, WITHOUT LONG-TERM CURRENT USE OF INSULIN: Primary | ICD-10-CM

## 2024-08-26 NOTE — TELEPHONE ENCOUNTER
Caller: Jared Restrepo    Relationship to patient: Self    Best call back number: [phone]     Patient is needing: STATED HE WOULD LIKE TO UPGRADE HIS G6 DEXCOM TO THE NEW G7. PLEASE CALL AND ADVISE.

## 2024-08-28 RX ORDER — ACYCLOVIR 400 MG/1
1 TABLET ORAL
Qty: 9 EACH | Refills: 3 | Status: SHIPPED | OUTPATIENT
Start: 2024-08-28

## 2024-08-28 RX ORDER — ACYCLOVIR 400 MG/1
1 TABLET ORAL CONTINUOUS
Qty: 1 EACH | Refills: 0 | Status: SHIPPED | OUTPATIENT
Start: 2024-08-28

## 2024-10-17 ENCOUNTER — OFFICE VISIT (OUTPATIENT)
Dept: FAMILY MEDICINE CLINIC | Age: 59
End: 2024-10-17
Payer: COMMERCIAL

## 2024-10-17 ENCOUNTER — LAB (OUTPATIENT)
Dept: LAB | Facility: HOSPITAL | Age: 59
End: 2024-10-17
Payer: COMMERCIAL

## 2024-10-17 VITALS
HEIGHT: 72 IN | BODY MASS INDEX: 29.23 KG/M2 | HEART RATE: 66 BPM | SYSTOLIC BLOOD PRESSURE: 132 MMHG | TEMPERATURE: 97.4 F | DIASTOLIC BLOOD PRESSURE: 80 MMHG | WEIGHT: 215.8 LBS

## 2024-10-17 DIAGNOSIS — E11.9 TYPE 2 DIABETES MELLITUS WITHOUT COMPLICATION, WITHOUT LONG-TERM CURRENT USE OF INSULIN: ICD-10-CM

## 2024-10-17 DIAGNOSIS — I10 ESSENTIAL (PRIMARY) HYPERTENSION: ICD-10-CM

## 2024-10-17 DIAGNOSIS — E78.2 MIXED HYPERLIPIDEMIA: ICD-10-CM

## 2024-10-17 DIAGNOSIS — I10 ESSENTIAL (PRIMARY) HYPERTENSION: Primary | ICD-10-CM

## 2024-10-17 LAB
ALBUMIN SERPL-MCNC: 4.5 G/DL (ref 3.5–5.2)
ALBUMIN UR-MCNC: <1.2 MG/DL
ALBUMIN/GLOB SERPL: 1.6 G/DL
ALP SERPL-CCNC: 83 U/L (ref 39–117)
ALT SERPL W P-5'-P-CCNC: 38 U/L (ref 1–41)
ANION GAP SERPL CALCULATED.3IONS-SCNC: 8.2 MMOL/L (ref 5–15)
AST SERPL-CCNC: 31 U/L (ref 1–40)
BILIRUB SERPL-MCNC: 0.9 MG/DL (ref 0–1.2)
BUN SERPL-MCNC: 16 MG/DL (ref 6–20)
BUN/CREAT SERPL: 13.9 (ref 7–25)
CALCIUM SPEC-SCNC: 10 MG/DL (ref 8.6–10.5)
CHLORIDE SERPL-SCNC: 99 MMOL/L (ref 98–107)
CHOLEST SERPL-MCNC: 130 MG/DL (ref 0–200)
CO2 SERPL-SCNC: 28.8 MMOL/L (ref 22–29)
CREAT SERPL-MCNC: 1.15 MG/DL (ref 0.76–1.27)
CREAT UR-MCNC: 170.8 MG/DL
EGFRCR SERPLBLD CKD-EPI 2021: 73.3 ML/MIN/1.73
GLOBULIN UR ELPH-MCNC: 2.9 GM/DL
GLUCOSE SERPL-MCNC: 107 MG/DL (ref 65–99)
HBA1C MFR BLD: 7 % (ref 4.8–5.6)
HDLC SERPL-MCNC: 43 MG/DL (ref 40–60)
LDLC SERPL CALC-MCNC: 73 MG/DL (ref 0–100)
LDLC/HDLC SERPL: 1.7 {RATIO}
MICROALBUMIN/CREAT UR: NORMAL MG/G{CREAT}
POTASSIUM SERPL-SCNC: 3.8 MMOL/L (ref 3.5–5.2)
PROT SERPL-MCNC: 7.4 G/DL (ref 6–8.5)
SODIUM SERPL-SCNC: 136 MMOL/L (ref 136–145)
TRIGL SERPL-MCNC: 69 MG/DL (ref 0–150)
VLDLC SERPL-MCNC: 14 MG/DL (ref 5–40)

## 2024-10-17 PROCEDURE — 83036 HEMOGLOBIN GLYCOSYLATED A1C: CPT

## 2024-10-17 PROCEDURE — 80061 LIPID PANEL: CPT

## 2024-10-17 PROCEDURE — 82570 ASSAY OF URINE CREATININE: CPT

## 2024-10-17 PROCEDURE — 36415 COLL VENOUS BLD VENIPUNCTURE: CPT

## 2024-10-17 PROCEDURE — 80053 COMPREHEN METABOLIC PANEL: CPT

## 2024-10-17 PROCEDURE — 82043 UR ALBUMIN QUANTITATIVE: CPT

## 2024-10-17 RX ORDER — ATORVASTATIN CALCIUM 10 MG/1
10 TABLET, FILM COATED ORAL DAILY
Qty: 90 TABLET | Refills: 1 | Status: SHIPPED | OUTPATIENT
Start: 2024-10-17

## 2024-10-17 RX ORDER — LISINOPRIL 10 MG/1
10 TABLET ORAL DAILY
Qty: 90 TABLET | Refills: 1 | Status: SHIPPED | OUTPATIENT
Start: 2024-10-17

## 2024-10-17 RX ORDER — ATENOLOL AND CHLORTHALIDONE TABLET 50; 25 MG/1; MG/1
0.5 TABLET ORAL DAILY
Qty: 45 TABLET | Refills: 1 | Status: SHIPPED | OUTPATIENT
Start: 2024-10-17

## 2024-10-17 NOTE — PROGRESS NOTES
Chief Complaint  Hypertension (6 month follow up HTN, HLD, diabetes.)    Subjective          Jared Restrepo presents to [facility]    History of Present Illness  Diabetes:  Current medication: metformin 1000 in am (does not need refills )   Tolerating medication: Yes  Last eye exam: 4-5 months ago, dr carson   Last foot exam: 4-2024  At home BS ranges: 135 or less   Lab Results       Component                Value               Date                       HGBA1C                   7.10 (H)            04/15/2024                Hyperlipidemia  Current medication: lipitor  Tolerating medication: Yes   [facility]     Lab Results       Component                Value               Date                       CHOL                     137                 04/15/2024                 CHLPL                    164                 01/07/2021                 TRIG                     71                  04/15/2024                 HDL                      42                  04/15/2024                 LDL                      81                  04/15/2024              Hypertension:  Current medication: lisinopril tenoretic  Tolerating Medication: Yes  Checking BP at home and it is: 120's over 80's   [facility]: [facility]  Labs:  Lab Results       Component                Value               Date                       GLUCOSE                  168 (H)             04/15/2024                 BUN                      20                  04/15/2024                 CREATININE               1.15                04/15/2024                 EGFRIFAFRI               99                  01/17/2022                 BCR                      17.4                04/15/2024                 K                        3.9                 04/15/2024                 CO2                      26.1                04/15/2024                 CALCIUM                  9.8                 04/15/2024                  ALBUMIN                  4.6                 04/15/2024                 LABIL2                   1.5                 2021                 AST                      24                  04/15/2024                 ALT                      27                  04/15/2024              Past Medical History: changes since 4-:          Hypertension         Surgical History:         lipoma;  removed     Procedures:    Colonoscopy ( 3-17-16 normal [facility] )         Family History:          Positive for Arrhythmia;     ; Positive for Lung Cancer;     Brother(s): 2 brother(s) total    ; Positive for Type 2 Diabetes;     Sister(s): 1 sister(s) total    ; Positive for thyroid disorder;     Daughter(s): Healthy; 1 daughter(s) total     Paternal Grandfather: Cause of death was cancer     Paternal Grandmother: Cause of death was cancer     Maternal Grandfather:      Maternal Grandmother:    Father:  at age 40's; Cause of death was pancreatitis, T2DM    Mother: Lung Cancer     Son(s): 3 son(s) total; 1 ;  MVA     Daughter(s): Healthy; 1 daughter(s) total         Social History:        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 )     Marital Status:      Children: 4 children, 3 living              Past Medical History:   Diagnosis Date    Essential (primary) hypertension     Hypercholesterolemia     Lipoma     Mixed hyperlipidemia     Type 2 diabetes mellitus without complication        Allergies   Allergen Reactions    Penicillins Hives        Past Surgical History:   Procedure Laterality Date    COLONOSCOPY  2016    [facility]; [facility]    COLONOSCOPY      @ [facility]    EXCISION MASS HEAD/NECK N/A 2023    Procedure: EXCISION MASS POSTERIOR RIGHT HEAD/NECK;  Surgeon: Uli House MD;  [place] [facility] [place];  Service: General;  Laterality: N/A;    LIPOMA EXCISION Right     NECK/HEAD AREA    TONSILLECTOMY          Social History      Tobacco Use    Smoking status: Never    Smokeless tobacco: Never   Substance Use Topics    Alcohol use: Yes     Alcohol/week: 1.0 standard drink of alcohol     Types: 1 Cans of beer per week     Comment: ON OCC       Family History   Problem Relation Age of Onset    Arrhythmia Mother     Lung cancer Mother     Cancer Mother     COPD Mother     Pancreatitis Father     Diabetes type II Father     Cancer Father     Thyroid disease Sister     Diabetes Sister     Diabetes type II Brother     Diabetes Brother     Diabetes Brother     Cancer Paternal Grandmother     Cancer Paternal Grandfather     Malig Hyperthermia Neg Hx         Health Maintenance Due   Topic Date Due    URINE MICROALBUMIN  10/09/2024    HEMOGLOBIN A1C  10/15/2024        Current Outpatient Medications on File Prior to Visit   Medication Sig    Continuous Blood Gluc Sensor (Dexcom G6 Sensor) USE EVERY 10 DAYS    Continuous Blood Gluc Transmit (Dexcom G6 Transmitter) misc USE CONTINUOUS    metFORMIN (GLUCOPHAGE) 1000 MG tablet TAKE 1 TABLET TWICE DAILY  WITH MEALS    POTASSIUM PO Take 1 tablet by mouth See Admin Instructions. ONCE A WEEK AS NEEDED    [DISCONTINUED] atenolol-chlorthalidone (TENORETIC) 50-25 MG per tablet TAKE 1/2 TABLET DAILY    [DISCONTINUED] atorvastatin (LIPITOR) 10 MG tablet TAKE 1 TABLET DAILY    [DISCONTINUED] lisinopril (PRINIVIL,ZESTRIL) 10 MG tablet TAKE 1 TABLET DAILY    [DISCONTINUED] Continuous Glucose  (Dexcom G7 ) device Use 1 Device Continuous.    [DISCONTINUED] Continuous Glucose Sensor (Dexcom G7 Sensor) misc Use 1 each Every 10 (Ten) Days.     No current facility-administered medications on file prior to visit.       Immunization History   Administered Date(s) Administered    COVID-19 (PFIZER) Purple Cap Monovalent 03/19/2021, 04/16/2021, 12/06/2021    Hepatitis A 05/02/2018, 02/12/2019    Tdap 09/30/2022       Review of Systems   Constitutional:  Negative for fatigue and fever.   Respiratory:  Negative  "for cough and shortness of breath.    Cardiovascular:  Negative for chest pain, palpitations and leg swelling.        Objective     Vitals:    10/17/24 1336   BP: 132/80   BP Location: Left arm   Patient Position: Sitting   Cuff Size: Large Adult   Pulse: 66   Temp: 97.4 °F (36.3 °C)   TempSrc: Oral   Weight: 97.9 kg (215 lb 12.8 oz)   Height: 182.9 cm (72\")            Physical Exam  Vitals reviewed.   Constitutional:       General: He is not in acute distress.     Appearance: Normal appearance.   Neck:      Vascular: No carotid bruit.   Cardiovascular:      Rate and Rhythm: Normal rate and regular rhythm.      Heart sounds: Normal heart sounds. No murmur heard.  Pulmonary:      Effort: Pulmonary effort is normal. No respiratory distress.      Breath sounds: Normal breath sounds.   Musculoskeletal:      Right lower leg: No edema.      Left lower leg: No edema.   Neurological:      Mental Status: He is alert.   Psychiatric:         Mood and Affect: Mood normal.         Behavior: Behavior normal.         Result Review :     The following data was reviewed by: CHRISTELLE Mar on 10/17/2024:                       Assessment and Plan      Diagnoses and all orders for this visit:    1. Essential (primary) hypertension (Primary)  Assessment & Plan:  Hypertension is stable. Continue to monitor BP at home. Continue current meds. Continue to modify diet and lifestyle. He fasted today, sent to lab.       Orders:  -     Comprehensive Metabolic Panel; Future  -     Lipid Panel; Future  -     atenolol-chlorthalidone (TENORETIC) 50-25 MG per tablet; Take 0.5 tablets by mouth Daily.  Dispense: 45 tablet; Refill: 1  -     lisinopril (PRINIVIL,ZESTRIL) 10 MG tablet; Take 1 tablet by mouth Daily.  Dispense: 90 tablet; Refill: 1    2. Mixed hyperlipidemia  Assessment & Plan:   Continue current medication and efforts with diet and exercise.       Orders:  -     Comprehensive Metabolic Panel; Future  -     Lipid Panel; " Future  -     atorvastatin (LIPITOR) 10 MG tablet; Take 1 tablet by mouth Daily.  Dispense: 90 tablet; Refill: 1    3. Type 2 diabetes mellitus without complication, without long-term current use of insulin  Assessment & Plan:  To work on diet, regular exercise, monitor sugars, check feet daily, see optometrist yearly or as directed.  Declines flu vaccine today.     Orders:  -     Comprehensive Metabolic Panel; Future  -     Lipid Panel; Future  -     Hemoglobin A1c; Future  -     Microalbumin / Creatinine Urine Ratio - Urine, Clean Catch; Future        BMI is >= 25 and <30. (Overweight) The following options were offered after discussion;: exercise counseling/recommendations and nutrition counseling/recommendations       Follow Up     Return for followup pending lab results.    Patient was given instructions and counseling regarding his condition or for health maintenance advice. Please see specific information pulled into the AVS if appropriate.

## 2024-10-17 NOTE — ASSESSMENT & PLAN NOTE
Hypertension is stable. Continue to monitor BP at home. Continue current meds. Continue to modify diet and lifestyle. He fasted today, sent to lab.

## 2024-10-17 NOTE — ASSESSMENT & PLAN NOTE
To work on diet, regular exercise, monitor sugars, check feet daily, see optometrist yearly or as directed.  Declines flu vaccine today.

## 2025-01-08 DIAGNOSIS — E11.9 TYPE 2 DIABETES MELLITUS WITHOUT COMPLICATION, WITHOUT LONG-TERM CURRENT USE OF INSULIN: ICD-10-CM

## 2025-01-09 RX ORDER — PROCHLORPERAZINE 25 MG/1
SUPPOSITORY RECTAL
Qty: 1 EACH | Refills: 3 | Status: SHIPPED | OUTPATIENT
Start: 2025-01-09

## 2025-04-22 ENCOUNTER — OFFICE VISIT (OUTPATIENT)
Dept: FAMILY MEDICINE CLINIC | Age: 60
End: 2025-04-22
Payer: COMMERCIAL

## 2025-04-22 ENCOUNTER — TELEPHONE (OUTPATIENT)
Dept: FAMILY MEDICINE CLINIC | Age: 60
End: 2025-04-22

## 2025-04-22 ENCOUNTER — LAB (OUTPATIENT)
Dept: LAB | Facility: HOSPITAL | Age: 60
End: 2025-04-22
Payer: COMMERCIAL

## 2025-04-22 VITALS
SYSTOLIC BLOOD PRESSURE: 125 MMHG | TEMPERATURE: 97.9 F | OXYGEN SATURATION: 98 % | BODY MASS INDEX: 28.85 KG/M2 | HEIGHT: 72 IN | DIASTOLIC BLOOD PRESSURE: 78 MMHG | HEART RATE: 65 BPM | WEIGHT: 213 LBS

## 2025-04-22 DIAGNOSIS — E11.9 TYPE 2 DIABETES MELLITUS WITHOUT COMPLICATION, WITHOUT LONG-TERM CURRENT USE OF INSULIN: ICD-10-CM

## 2025-04-22 DIAGNOSIS — Z00.00 ROUTINE GENERAL MEDICAL EXAMINATION AT A HEALTH CARE FACILITY: ICD-10-CM

## 2025-04-22 DIAGNOSIS — E11.9 TYPE 2 DIABETES MELLITUS WITHOUT COMPLICATION, WITHOUT LONG-TERM CURRENT USE OF INSULIN: Primary | ICD-10-CM

## 2025-04-22 DIAGNOSIS — I10 ESSENTIAL (PRIMARY) HYPERTENSION: ICD-10-CM

## 2025-04-22 DIAGNOSIS — E78.2 MIXED HYPERLIPIDEMIA: ICD-10-CM

## 2025-04-22 DIAGNOSIS — Z12.5 SCREENING FOR PROSTATE CANCER: ICD-10-CM

## 2025-04-22 PROBLEM — R22.1 LOCALIZED SWELLING, MASS AND LUMP, NECK: Status: RESOLVED | Noted: 2023-03-21 | Resolved: 2025-04-22

## 2025-04-22 LAB
ALBUMIN SERPL-MCNC: 4.4 G/DL (ref 3.5–5.2)
ALBUMIN/GLOB SERPL: 1.7 G/DL
ALP SERPL-CCNC: 81 U/L (ref 39–117)
ALT SERPL W P-5'-P-CCNC: 22 U/L (ref 1–41)
ANION GAP SERPL CALCULATED.3IONS-SCNC: 10.3 MMOL/L (ref 5–15)
AST SERPL-CCNC: 26 U/L (ref 1–40)
BASOPHILS # BLD AUTO: 0.03 10*3/MM3 (ref 0–0.2)
BASOPHILS NFR BLD AUTO: 0.7 % (ref 0–1.5)
BILIRUB SERPL-MCNC: 1.1 MG/DL (ref 0–1.2)
BUN SERPL-MCNC: 16 MG/DL (ref 8–23)
BUN/CREAT SERPL: 15.2 (ref 7–25)
CALCIUM SPEC-SCNC: 9.7 MG/DL (ref 8.6–10.5)
CHLORIDE SERPL-SCNC: 100 MMOL/L (ref 98–107)
CHOLEST SERPL-MCNC: 145 MG/DL (ref 0–200)
CO2 SERPL-SCNC: 24.7 MMOL/L (ref 22–29)
CREAT SERPL-MCNC: 1.05 MG/DL (ref 0.76–1.27)
DEPRECATED RDW RBC AUTO: 38 FL (ref 37–54)
EGFRCR SERPLBLD CKD-EPI 2021: 81.3 ML/MIN/1.73
EOSINOPHIL # BLD AUTO: 0.08 10*3/MM3 (ref 0–0.4)
EOSINOPHIL NFR BLD AUTO: 1.9 % (ref 0.3–6.2)
ERYTHROCYTE [DISTWIDTH] IN BLOOD BY AUTOMATED COUNT: 11.8 % (ref 12.3–15.4)
GLOBULIN UR ELPH-MCNC: 2.6 GM/DL
GLUCOSE SERPL-MCNC: 150 MG/DL (ref 65–99)
HBA1C MFR BLD: 7.3 % (ref 4.8–5.6)
HCT VFR BLD AUTO: 40.9 % (ref 37.5–51)
HDLC SERPL-MCNC: 44 MG/DL (ref 40–60)
HGB BLD-MCNC: 14.3 G/DL (ref 13–17.7)
IMM GRANULOCYTES # BLD AUTO: 0 10*3/MM3 (ref 0–0.05)
IMM GRANULOCYTES NFR BLD AUTO: 0 % (ref 0–0.5)
LDLC SERPL CALC-MCNC: 86 MG/DL (ref 0–100)
LDLC/HDLC SERPL: 1.96 {RATIO}
LYMPHOCYTES # BLD AUTO: 1.93 10*3/MM3 (ref 0.7–3.1)
LYMPHOCYTES NFR BLD AUTO: 44.9 % (ref 19.6–45.3)
MCH RBC QN AUTO: 30.5 PG (ref 26.6–33)
MCHC RBC AUTO-ENTMCNC: 35 G/DL (ref 31.5–35.7)
MCV RBC AUTO: 87.2 FL (ref 79–97)
MONOCYTES # BLD AUTO: 0.38 10*3/MM3 (ref 0.1–0.9)
MONOCYTES NFR BLD AUTO: 8.8 % (ref 5–12)
NEUTROPHILS NFR BLD AUTO: 1.88 10*3/MM3 (ref 1.7–7)
NEUTROPHILS NFR BLD AUTO: 43.7 % (ref 42.7–76)
PLATELET # BLD AUTO: 181 10*3/MM3 (ref 140–450)
PMV BLD AUTO: 10.1 FL (ref 6–12)
POTASSIUM SERPL-SCNC: 3.5 MMOL/L (ref 3.5–5.2)
PROT SERPL-MCNC: 7 G/DL (ref 6–8.5)
PSA SERPL-MCNC: 0.81 NG/ML (ref 0–4)
RBC # BLD AUTO: 4.69 10*6/MM3 (ref 4.14–5.8)
SODIUM SERPL-SCNC: 135 MMOL/L (ref 136–145)
TRIGL SERPL-MCNC: 74 MG/DL (ref 0–150)
TSH SERPL DL<=0.05 MIU/L-ACNC: 1.89 UIU/ML (ref 0.27–4.2)
VLDLC SERPL-MCNC: 15 MG/DL (ref 5–40)
WBC NRBC COR # BLD AUTO: 4.3 10*3/MM3 (ref 3.4–10.8)

## 2025-04-22 PROCEDURE — 80050 GENERAL HEALTH PANEL: CPT

## 2025-04-22 PROCEDURE — 80061 LIPID PANEL: CPT

## 2025-04-22 PROCEDURE — G0103 PSA SCREENING: HCPCS

## 2025-04-22 PROCEDURE — 36415 COLL VENOUS BLD VENIPUNCTURE: CPT

## 2025-04-22 PROCEDURE — 83036 HEMOGLOBIN GLYCOSYLATED A1C: CPT

## 2025-04-22 RX ORDER — LANCETS
1 EACH MISCELLANEOUS 2 TIMES DAILY
Qty: 100 EACH | Refills: 3 | Status: SHIPPED | OUTPATIENT
Start: 2025-04-22

## 2025-04-22 RX ORDER — ATORVASTATIN CALCIUM 10 MG/1
10 TABLET, FILM COATED ORAL DAILY
Qty: 90 TABLET | Refills: 1 | Status: SHIPPED | OUTPATIENT
Start: 2025-04-22

## 2025-04-22 RX ORDER — ATENOLOL AND CHLORTHALIDONE TABLET 50; 25 MG/1; MG/1
0.5 TABLET ORAL DAILY
Qty: 45 TABLET | Refills: 1 | Status: SHIPPED | OUTPATIENT
Start: 2025-04-22

## 2025-04-22 RX ORDER — BLOOD SUGAR DIAGNOSTIC
1 STRIP MISCELLANEOUS 2 TIMES DAILY
Qty: 100 STRIP | Refills: 3 | Status: SHIPPED | OUTPATIENT
Start: 2025-04-22

## 2025-04-22 RX ORDER — LANCETS
1 EACH MISCELLANEOUS 2 TIMES DAILY
COMMUNITY
End: 2025-04-22 | Stop reason: SDUPTHER

## 2025-04-22 RX ORDER — METFORMIN HYDROCHLORIDE 500 MG/1
TABLET, EXTENDED RELEASE ORAL
Qty: 270 TABLET | Refills: 1 | Status: SHIPPED | OUTPATIENT
Start: 2025-04-22

## 2025-04-22 RX ORDER — LISINOPRIL 10 MG/1
10 TABLET ORAL DAILY
Qty: 90 TABLET | Refills: 1 | Status: SHIPPED | OUTPATIENT
Start: 2025-04-22

## 2025-04-22 RX ORDER — PROCHLORPERAZINE 25 MG/1
SUPPOSITORY RECTAL
Qty: 9 EACH | Refills: 2 | Status: SHIPPED | OUTPATIENT
Start: 2025-04-22

## 2025-04-22 RX ORDER — BLOOD SUGAR DIAGNOSTIC
1 STRIP MISCELLANEOUS 2 TIMES DAILY
COMMUNITY
End: 2025-04-22 | Stop reason: SDUPTHER

## 2025-04-22 NOTE — ASSESSMENT & PLAN NOTE
Hypertension is stable. Continue to monitor BP at home. Continue current meds. Continue to modify diet and lifestyle. He is fasting today.

## 2025-04-22 NOTE — Clinical Note
He Ne[facility]com [facility] G [facility] and would like some  lancets and test strips for his contour next, a renetta product to [facility] as well, help with this please

## 2025-04-22 NOTE — ASSESSMENT & PLAN NOTE
Advise regular exercise, healthy eating, always wear seat belts. fall prevention discussed.  Immunizations discussed, advised to check on coverage of shingrex at his pharmacy and pneumonia vaccine, declines any updates today.   To continue yearly optometry and set up yearly dental exams.

## 2025-04-22 NOTE — ASSESSMENT & PLAN NOTE
Needs dexcom sensor G 6 & lancets and test strips contour next a [facility] to [facility]  To work on diet, regular exercise, monitor sugars, check feet daily, see optometrist yearly or as directed.

## 2025-04-22 NOTE — TELEPHONE ENCOUNTER
----- Message from Anamaria Edwards sent at 4/22/2025  9:49 AM EDT -----  He Needs DVTel sensor G [facility] to [facility] and would like some  lancets and test strips for his contour next, a [facility] product to [facility] as well, help with this please

## 2025-04-22 NOTE — PROGRESS NOTES
Chief Complaint  Annual Exam and follow up of diabetes, HLD, HTN    Subjective          Jared Restrepo presents to [facility]    History of Present Illness  General Health Questions  Regular exercise as least 3 days a week: now he is   Follows a healthy diet: yes   Wears seat belt always: yes   Drinks Alcohol: once a week   Uses Recreational drugs: none   Uses tobacco products: none   UTD on Tetanus vaccine: UTD 9-30-22  Last PSA :one year ago, normal   Last colon screen:3-  Optometry exams: UTD   Dental exams:Dr Solorio, past due     Diabetes:  Current medication: metfomrin 500 mg [facility] prefers ([facility]) (only taking 1000 mg a day but has done better on 1500 mg a day)   Tolerating medication: Yes   Last eye exam: sees dr Hurtado   Last foot exam: one year ago   At home BS ranges: 140 -150 (had not been following his diet and exercise as well, but has started back on this   Lab Results       Component                Value               Date                       HGBA1C                   7.00 (H)            10/17/2024              Hyperlipidemia  Current medication: lipitor   Tolerating medication: Yes  [facility]eds [facility]    Lab Results       Component                Value               Date                       CHOL                     130                 10/17/2024                 CHLPL                    164                 01/07/2021                 TRIG                     69                  10/17/2024                 HDL                      43                  10/17/2024                 LDL                      73                  10/17/2024                Hypertension:  Current medication: lisinopril and tenoretic   Tolerating Medication: Yes  Checking BP at home and it is: same as here   [facility]: [facility]   Labs:  Lab Results       Component                Value               Date                       GLUCOSE                  107 (H)             10/17/2024                  BUN                      16                  10/17/2024                 CREATININE               1.15                10/17/2024                 EGFRIFAFRI               99                  2022                 BCR                      13.9                10/17/2024                 K                        3.8                 10/17/2024                 CO2                      28.8                10/17/2024                 CALCIUM                  10.0                10/17/2024                 ALBUMIN                  4.5                 10/17/2024                 AST                      31                  10/17/2024                 ALT                      38                  10/17/2024              Past Medical History: changes since 10-:          Hypertension         Surgical History:         lipoma;  removed     Procedures:    Colonoscopy ( 3-17-16 normal [facility] )         Family History:         Paternal Grandfather: Cause of death was cancer     Paternal Grandmother: Cause of death was cancer     Maternal Grandfather:      Maternal Grandmother:      Father:  at age 40's; Cause of death was pancreatitis,         T2DM    Mother:  82 () Lung Cancer     Son(s): 3 son(s) total; 1 ;  MVA     Daughter(s): Healthy; 1 daughter(s) total    Brother: 2, T2DM   Sister: 1 thyroid disorder         Social History:        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 )     Marital Status:      Children: 4 children, 3 living                 Past Medical History:   Diagnosis Date    Essential (primary) hypertension     Hypercholesterolemia     Lipoma     Mixed hyperlipidemia     Type 2 diabetes mellitus without complication        Allergies   Allergen Reactions    Penicillins Hives        Past Surgical History:   Procedure Laterality Date    [facility]    [facility]      @ [facility]     EXCISION MASS HEAD/NECK N/A 5/12/2023    Procedure: EXCISION MASS POSTERIOR RIGHT HEAD/NECK;  Surgeon: Uli House MD;  Location: [facility];  Service: General;  Laterality: N/A;    LIPOMA EXCISION Right     NECK/HEAD AREA    TONSILLECTOMY          Social History     Tobacco Use    Smoking status: Never    Smokeless tobacco: Never   Substance Use Topics    Alcohol use: Yes     Alcohol/week: 1.0 standard drink of alcohol     Types: 1 Cans of beer per week     Comment: ON OCC       Family History   Problem Relation Age of Onset    Arrhythmia Mother     Lung cancer Mother     Cancer Mother     COPD Mother     Pancreatitis Father     Diabetes type II Father     Cancer Father     Thyroid disease Sister     Diabetes Sister     Diabetes type II Brother     Diabetes Brother     Diabetes Brother     Cancer Paternal Grandmother     Cancer Paternal Grandfather     Malig Hyperthermia Neg Hx         Health Maintenance Due   Topic Date Due    Pneumococcal Vaccine 50+ (1 of 2 - PCV) Never done    ZOSTER VACCINE (1 of 2) Never done    COVID-19 Vaccine (4 - 2024-25 season) 09/01/2024    ANNUAL PHYSICAL  04/15/2025    DIABETIC FOOT EXAM  04/15/2025    HEMOGLOBIN A1C  04/17/2025        Current Outpatient Medications on File Prior to Visit   Medication Sig    Continuous Blood Gluc Sensor (Dexcom G6 Sensor) USE EVERY 10 DAYS    Continuous Glucose Transmitter (Dexcom G6 Transmitter) misc USE CONTINUOUS    POTASSIUM PO Take 1 tablet by mouth See Admin Instructions. ONCE A WEEK AS NEEDED    [DISCONTINUED] atenolol-chlorthalidone (TENORETIC) 50-25 MG per tablet Take 0.5 tablets by mouth Daily.    [DISCONTINUED] atorvastatin (LIPITOR) 10 MG tablet Take 1 tablet by mouth Daily.    [DISCONTINUED] lisinopril (PRINIVIL,ZESTRIL) 10 MG tablet Take 1 tablet by mouth Daily.    [DISCONTINUED] metFORMIN (GLUCOPHAGE) 1000 MG tablet TAKE 1 TABLET TWICE DAILY  WITH MEALS     No current facility-administered medications on file prior to visit.  "      Immunization History   Administered Date(s) Administered    COVID-19 (PFIZER) Purple Cap Monovalent 03/19/2021, 04/16/2021, 12/06/2021    Hepatitis A 05/02/2018, 02/12/2019    Tdap 09/30/2022       Review of Systems   Constitutional:  Negative for fatigue and fever.   HENT:  Negative for ear pain and sore throat.    Eyes:  Negative for blurred vision.   Respiratory:  Negative for cough and shortness of breath.    Cardiovascular:  Negative for chest pain, palpitations and leg swelling.   Gastrointestinal:  Negative for abdominal pain, constipation, diarrhea, nausea and vomiting.   Genitourinary:  Negative for difficulty urinating.   Musculoskeletal:  Negative for arthralgias and myalgias.   Skin:  Negative for rash.   Neurological:  Negative for dizziness, weakness and headache.   Psychiatric/Behavioral:  Negative for sleep disturbance and depressed mood.         Objective     Vitals:    04/22/25 0921   BP: 125/78   BP Location: Right arm   Patient Position: Sitting   Pulse: 65   Temp: 97.9 °F (36.6 °C)   TempSrc: Oral   SpO2: 98%   Weight: 96.6 kg (213 lb)   Height: 182.9 cm (72.01\")            Physical Exam  Vitals reviewed.   Constitutional:       Appearance: Normal appearance. He is well-developed.   HENT:      Head: Normocephalic and atraumatic.      Right Ear: External ear normal.      Left Ear: External ear normal.      Mouth/Throat:      Pharynx: No oropharyngeal exudate.   Eyes:      Conjunctiva/sclera: Conjunctivae normal.      Pupils: Pupils are equal, round, and reactive to light.   Neck:      Vascular: No carotid bruit.   Cardiovascular:      Rate and Rhythm: Normal rate and regular rhythm.      Pulses:           Posterior tibial pulses are 2+ on the right side and 2+ on the left side.      Heart sounds: No murmur heard.     No friction rub. No gallop.   Pulmonary:      Effort: Pulmonary effort is normal. No respiratory distress.      Breath sounds: Normal breath sounds. No wheezing or rhonchi. "   Abdominal:      General: Bowel sounds are normal. There is no distension.      Palpations: Abdomen is soft.      Tenderness: There is no abdominal tenderness.      Comments:       Musculoskeletal:         General: No swelling.   Feet:      Right foot:      Protective Sensation: 3 sites tested.  3 sites sensed.      Skin integrity: Skin integrity normal. No ulcer or blister.      Toenail Condition: Right toenails are normal.      Left foot:      Protective Sensation: 3 sites tested.  3 sites sensed.      Skin integrity: Skin integrity normal. No ulcer or blister.      Toenail Condition: Left toenails are normal.      Comments: Diabetic Foot Exam Performed and Monofilament Test Performed     Skin:     General: Skin is warm and dry.   Neurological:      Mental Status: He is alert and oriented to person, place, and time.      Cranial Nerves: No cranial nerve deficit.      Gait: Gait normal.   Psychiatric:         Mood and Affect: Mood and affect normal.         Behavior: Behavior normal.         Thought Content: Thought content normal.         Judgment: Judgment normal.         Result Review :     The following data was reviewed by: CHRISTELLE Mar on 04/22/2025:                       Assessment and Plan      Diagnoses and all orders for this visit:    1. Type 2 diabetes mellitus without complication, without long-term current use of insulin (Primary)  Assessment & Plan:  Needs dexcom sensor G 6 & lancets and test strips contour [facility]  To work on diet, regular exercise, monitor sugars, check feet daily, see optometrist yearly or as directed.      Orders:  -     Comprehensive Metabolic Panel; Future  -     Lipid Panel; Future  -     Hemoglobin A1c; Future    2. Screening for prostate cancer  Assessment & Plan:  Discussed and will screen with a PSA     Orders:  -     PSA SCREENING; Future    3. Routine general medical examination at a health care facility  Assessment & Plan:  Advise regular  exercise, healthy eating, always wear seat belts. fall prevention discussed.  Immunizations discussed, advised to check on coverage of shingrex at his pharmacy and pneumonia vaccine, declines any updates today.   To continue yearly optometry and set up yearly dental exams.        Orders:  -     Comprehensive Metabolic Panel; Future  -     Lipid Panel; Future  -     Hemoglobin A1c; Future  -     TSH Rfx On Abnormal To Free T4  -     CBC w AUTO Differential; Future  -     PSA SCREENING; Future    4. Mixed hyperlipidemia  Assessment & Plan:   Continue current medication and efforts with diet and exercise.       Orders:  -     Comprehensive Metabolic Panel; Future  -     Lipid Panel; Future  -     atorvastatin (LIPITOR) 10 MG tablet; Take 1 tablet by mouth Daily.  Dispense: 90 tablet; Refill: 1    5. Essential (primary) hypertension  Assessment & Plan:  Hypertension is stable. Continue to monitor BP at home. Continue current meds. Continue to modify diet and lifestyle. He is fasting today.       Orders:  -     Comprehensive Metabolic Panel; Future  -     atenolol-chlorthalidone (TENORETIC) 50-25 MG per tablet; Take 0.5 tablets by mouth Daily.  Dispense: 45 tablet; Refill: 1  -     lisinopril (PRINIVIL,ZESTRIL) 10 MG tablet; Take 1 tablet by mouth Daily.  Dispense: 90 tablet; Refill: 1    Other orders  -     metFORMIN ER (GLUCOPHAGE-XR) 500 MG 24 hr tablet; Take 2 in am and 1 pm  Dispense: 270 tablet; Refill: 1                    Follow Up     Return for followup pending lab results.    Patient was given instructions and counseling regarding his condition or for health maintenance advice. Please see specific information pulled into the AVS if appropriate.        negative detailed exam

## 2025-04-26 ENCOUNTER — RESULTS FOLLOW-UP (OUTPATIENT)
Dept: FAMILY MEDICINE CLINIC | Age: 60
End: 2025-04-26
Payer: COMMERCIAL

## 2025-04-26 NOTE — LETTER
Jared Alas Restrepo  [address] [facility] [address]    May 1, 2025       Jared,     Your prostate blood test was normal.  Your blood count was fine.  Your cholesterol panel close to goal.  Your A1C was up some.  The goal is less than 7.  Your glucose was 150 and the rest of your metabolic panel was fine.  I would continue your recent efforts with improvement with your diet, get regular exercise and continue your current medications.  Your thyroid lab was normal.     Resulted Orders   TSH Rfx On Abnormal To Free T4   Result Value Ref Range    TSH 1.890 0.270 - 4.200 uIU/mL   Comprehensive Metabolic Panel   Result Value Ref Range    Glucose 150 (H) 65 - 99 mg/dL    BUN 16 8 - 23 mg/dL    Creatinine 1.05 0.76 - 1.27 mg/dL    Sodium 135 (L) 136 - 145 mmol/L    Potassium 3.5 3.5 - 5.2 mmol/L    Chloride 100 98 - 107 mmol/L    CO2 24.7 22.0 - 29.0 mmol/L    Calcium 9.7 8.6 - 10.5 mg/dL    Total Protein 7.0 6.0 - 8.5 g/dL    Albumin 4.4 3.5 - 5.2 g/dL    ALT (SGPT) 22 1 - 41 U/L    AST (SGOT) 26 1 - 40 U/L    Alkaline Phosphatase 81 39 - 117 U/L    Total Bilirubin 1.1 0.0 - 1.2 mg/dL    Globulin 2.6 gm/dL    A/G Ratio 1.7 g/dL    BUN/Creatinine Ratio 15.2 7.0 - 25.0    Anion Gap 10.3 5.0 - 15.0 mmol/L    eGFR 81.3 >60.0 mL/min/1.73   Lipid Panel   Result Value Ref Range    Total Cholesterol 145 0 - 200 mg/dL    Triglycerides 74 0 - 150 mg/dL    HDL Cholesterol 44 40 - 60 mg/dL    LDL Cholesterol  86 0 - 100 mg/dL    VLDL Cholesterol 15 5 - 40 mg/dL    LDL/HDL Ratio 1.96    Hemoglobin A1c   Result Value Ref Range    Hemoglobin A1C 7.30 (H) 4.80 - 5.60 %   PSA SCREENING   Result Value Ref Range    PSA 0.812 0.000 - 4.000 ng/mL   CBC Auto Differential   Result Value Ref Range    WBC 4.30 3.40 - 10.80 10*3/mm3    RBC 4.69 4.14 - 5.80 10*6/mm3    Hemoglobin 14.3 13.0 - 17.7 g/dL    Hematocrit 40.9 37.5 - 51.0 %    MCV 87.2 79.0 - 97.0 fL    MCH 30.5 26.6 - 33.0 pg    MCHC 35.0 31.5 - 35.7 g/dL    RDW 11.8 (L) 12.3 - 15.4  %    RDW-SD 38.0 37.0 - 54.0 fl    MPV 10.1 6.0 - 12.0 fL    Platelets 181 140 - 450 10*3/mm3    Neutrophil % 43.7 42.7 - 76.0 %    Lymphocyte % 44.9 19.6 - 45.3 %    Monocyte % 8.8 5.0 - 12.0 %    Eosinophil % 1.9 0.3 - 6.2 %    Basophil % 0.7 0.0 - 1.5 %    Immature Grans % 0.0 0.0 - 0.5 %    Neutrophils, Absolute 1.88 1.70 - 7.00 10*3/mm3    Lymphocytes, Absolute 1.93 0.70 - 3.10 10*3/mm3    Monocytes, Absolute 0.38 0.10 - 0.90 10*3/mm3    Eosinophils, Absolute 0.08 0.00 - 0.40 10*3/mm3    Basophils, Absolute 0.03 0.00 - 0.20 10*3/mm3    Immature Grans, Absolute 0.00 0.00 - 0.05 10*3/mm3       If you have any questions or concerns, please don't hesitate to call.         Sincerely,        Anamaria Edwards APRN

## 2025-06-09 NOTE — TELEPHONE ENCOUNTER
Medication failed protocol.    Medication: Glimepiride   Medication Refill Protocol Failed.    Last office visit date:01/02/2025  Next appointment scheduled?: No;    Per [employer].     Patient needs a senor, transmitter and  for the Dexcom sent in,     Should read as transmitter and  1 each for a 90 day supply    sensors 9 packs.     Will send in

## (undated) DEVICE — NDL HYPO PRECISIONGLIDE REG 25G 1 1/2

## (undated) DEVICE — SUT VIC 5/0 PS2 18IN J495H

## (undated) DEVICE — SKIN PREP TRAY W/CHG: Brand: MEDLINE INDUSTRIES, INC.

## (undated) DEVICE — ADHS SKIN SURG TISS VISC PREMIERPRO EXOFIN HI/VISC FAST/DRY

## (undated) DEVICE — GLV SURG PREMIERPRO ORTHO LTX PF SZ7.5 BRN

## (undated) DEVICE — TBG PENCL TELESCP MEGADYNE SMOKE EVAC 10FT

## (undated) DEVICE — TRAP FLD MINIVAC MEGADYNE 100ML

## (undated) DEVICE — SUT VIC 3/0 SH 27IN J416H

## (undated) DEVICE — ELECTRD BLD EZ CLN MOD XLNG 2.75IN

## (undated) DEVICE — LAG MINOR PROCEDURE: Brand: MEDLINE INDUSTRIES, INC.

## (undated) DEVICE — UNDYED BRAIDED (POLYGLACTIN 910), SYNTHETIC ABSORBABLE SUTURE: Brand: COATED VICRYL